# Patient Record
Sex: MALE | Employment: FULL TIME | URBAN - METROPOLITAN AREA
[De-identification: names, ages, dates, MRNs, and addresses within clinical notes are randomized per-mention and may not be internally consistent; named-entity substitution may affect disease eponyms.]

---

## 2017-03-10 ENCOUNTER — CLINICAL SUPPORT (OUTPATIENT)
Dept: CARDIOLOGY CLINIC | Age: 67
End: 2017-03-10

## 2017-03-10 DIAGNOSIS — Z95.0 CARDIAC PACEMAKER IN SITU: Primary | ICD-10-CM

## 2017-06-16 ENCOUNTER — CLINICAL SUPPORT (OUTPATIENT)
Dept: CARDIOLOGY CLINIC | Age: 67
End: 2017-06-16

## 2017-06-16 DIAGNOSIS — Z95.0 CARDIAC PACEMAKER IN SITU: Primary | ICD-10-CM

## 2017-09-29 ENCOUNTER — CLINICAL SUPPORT (OUTPATIENT)
Dept: CARDIOLOGY CLINIC | Age: 67
End: 2017-09-29

## 2017-09-29 DIAGNOSIS — Z95.0 CARDIAC PACEMAKER IN SITU: Primary | ICD-10-CM

## 2017-11-25 ENCOUNTER — HOSPITAL ENCOUNTER (OUTPATIENT)
Dept: CT IMAGING | Age: 67
Discharge: HOME | End: 2017-11-25
Attending: INTERNAL MEDICINE
Payer: COMMERCIAL

## 2017-11-25 DIAGNOSIS — R10.32 LLQ PAIN: ICD-10-CM

## 2017-11-25 PROCEDURE — 74177 CT ABD & PELVIS W/CONTRAST: CPT

## 2017-11-25 PROCEDURE — 74011636320 HC RX REV CODE- 636/320

## 2017-11-25 RX ADMIN — IOPAMIDOL 100 ML: 755 INJECTION, SOLUTION INTRAVENOUS at 09:26

## 2018-01-05 ENCOUNTER — CLINICAL SUPPORT (OUTPATIENT)
Dept: CARDIOLOGY CLINIC | Age: 68
End: 2018-01-05

## 2018-01-05 DIAGNOSIS — Z95.0 CARDIAC PACEMAKER IN SITU: Primary | ICD-10-CM

## 2018-04-13 ENCOUNTER — CLINICAL SUPPORT (OUTPATIENT)
Dept: CARDIOLOGY CLINIC | Age: 68
End: 2018-04-13

## 2018-04-13 DIAGNOSIS — Z95.0 CARDIAC PACEMAKER IN SITU: Primary | ICD-10-CM

## 2018-07-16 ENCOUNTER — CLINICAL SUPPORT (OUTPATIENT)
Dept: CARDIOLOGY CLINIC | Age: 68
End: 2018-07-16

## 2018-07-16 DIAGNOSIS — Z95.0 CARDIAC PACEMAKER IN SITU: Primary | ICD-10-CM

## 2018-10-08 ENCOUNTER — CLINICAL SUPPORT (OUTPATIENT)
Dept: CARDIOLOGY CLINIC | Age: 68
End: 2018-10-08

## 2018-10-08 DIAGNOSIS — Z95.0 CARDIAC PACEMAKER IN SITU: Primary | ICD-10-CM

## 2019-01-21 ENCOUNTER — CLINICAL SUPPORT (OUTPATIENT)
Dept: CARDIOLOGY CLINIC | Age: 69
End: 2019-01-21

## 2019-01-21 DIAGNOSIS — Z95.0 CARDIAC PACEMAKER IN SITU: Primary | ICD-10-CM

## 2019-07-01 ENCOUNTER — CLINICAL SUPPORT (OUTPATIENT)
Dept: CARDIOLOGY CLINIC | Age: 69
End: 2019-07-01

## 2019-07-01 DIAGNOSIS — Z95.0 CARDIAC PACEMAKER IN SITU: Primary | ICD-10-CM

## 2019-10-07 ENCOUNTER — CLINICAL SUPPORT (OUTPATIENT)
Dept: CARDIOLOGY CLINIC | Age: 69
End: 2019-10-07

## 2019-10-07 DIAGNOSIS — Z95.0 CARDIAC PACEMAKER IN SITU: Primary | ICD-10-CM

## 2020-01-03 ENCOUNTER — CLINICAL SUPPORT (OUTPATIENT)
Dept: CARDIOLOGY CLINIC | Age: 70
End: 2020-01-03

## 2020-01-03 DIAGNOSIS — Z95.0 CARDIAC PACEMAKER IN SITU: Primary | ICD-10-CM

## 2020-02-13 ENCOUNTER — HOSPITAL ENCOUNTER (OUTPATIENT)
Dept: CT IMAGING | Age: 70
Discharge: HOME | End: 2020-02-13
Attending: INTERNAL MEDICINE
Payer: COMMERCIAL

## 2020-02-13 DIAGNOSIS — R05.9 COUGH: ICD-10-CM

## 2020-02-13 PROCEDURE — 71250 CT THORAX DX C-: CPT

## 2020-03-02 ENCOUNTER — CLINICAL SUPPORT (OUTPATIENT)
Dept: CARDIOLOGY CLINIC | Age: 70
End: 2020-03-02

## 2020-03-02 DIAGNOSIS — Z95.0 CARDIAC PACEMAKER IN SITU: Primary | ICD-10-CM

## 2020-03-03 ENCOUNTER — TELEPHONE (OUTPATIENT)
Dept: CARDIOLOGY CLINIC | Age: 70
End: 2020-03-03

## 2020-03-03 NOTE — TELEPHONE ENCOUNTER
Returned patient's call, ID verified using two patient identifiers. Answered all of patient's questions to his satisfaction. His generator change will be an outpatient procedure and it is fine for him to have his colonoscopy as scheduled.

## 2020-03-10 NOTE — PROGRESS NOTES
HISTORY OF PRESENTING ILLNESS      Benjamin Keys is a 81 y.o. male with history of complete heart block s/p dual chamber pacemaker, hypertension, dyslipidemia, right renal mass, ?atrial fibrillation/atrial flutter whose pacemaker is now nearing GIL[place]. Denies GOTTLIEB, edema, fatigue, syncope. No recurrences of AF/AFL on device check today. ACTIVE PROBLEM LIST     Patient Active Problem List    Diagnosis Date Noted    Pacemaker 10/14/2014    Dyslipidemia 10/14/2014    Other and unspecified hyperlipidemia 12/07/2012    Essential hypertension, benign 12/07/2012    Complete heart block ([address] 12/07/2012    Right renal mass 12/04/2012           PAST MEDICAL HISTORY     Past Medical History:   Diagnosis Date    Hyperlipidemia     Other ill-defined conditions(799.89)     right renal mass    Other ill-defined conditions(799.89)     right renal mass    Pacemaker 11/27/12    [employer] #[id number] dual chamber system  [facility]           PAST SURGICAL HISTORY     Past Surgical History:   Procedure Laterality Date    HX NEPHRECTOMY      right partial    HX PACEMAKER      11/12          ALLERGIES     No Known Allergies       FAMILY HISTORY     Family History   Problem Relation Age of Onset    Strabismus Mother     Heart Attack Father     Breast Cancer Sister     negative for cardiac disease       SOCIAL HISTORY     Social History     Socioeconomic History    Marital status:      Spouse name: Not on file    Number of children: Not on file    Years of education: Not on file    Highest education level: Not on file   Tobacco Use    Smoking status: Former Smoker     Packs/day: 0.25     Years: 30.00     Pack years: 7.50    Smokeless tobacco: Former User   Substance and Sexual Activity    Alcohol use: Yes     Comment: occassionally    Drug use: No         MEDICATIONS     Current Outpatient Medications   Medication Sig    aspirin delayed-release 81 mg tablet Take  by mouth daily.     losartan (COZAAR) 25 mg tablet Take 25 mg by mouth daily.  rosuvastatin (CRESTOR) 5 mg tablet Take 5 mg by mouth daily.  PSYLLIUM SEED, WITH SUGAR, (METAMUCIL PO) Take  by mouth. No current facility-administered medications for this visit. I have reviewed the nurses notes, vitals, problem list, allergy list, medical history, family, social history and medications. REVIEW OF SYMPTOMS      General: Pt denies excessive weight gain or loss. Pt is able to conduct ADL's  HEENT: Denies blurred vision, headaches, hearing loss, epistaxis and difficulty swallowing. Respiratory: Denies cough, congestion, shortness of breath, GOTTLIEB, wheezing or stridor. Cardiovascular: Denies precordial pain, palpitations, edema or PND  Gastrointestinal: Denies poor appetite, indigestion, abdominal pain or blood in stool  Genitourinary: Denies hematuria, dysuria, increased urinary frequency  Musculoskeletal: Denies joint pain or swelling from muscles or joints  Neurologic: Denies tremor, paresthesias, headache, or sensory motor disturbance  Psychiatric: Denies confusion, insomnia, depression  Integumentray: Denies rash, itching or ulcers. Hematologic: Denies easy bruising, bleeding       PHYSICAL EXAMINATION      There were no vitals filed for this visit. General: Well developed, in no acute distress. HEENT: No jaundice, oral mucosa moist, no oral ulcers  Neck: Supple, no stiffness, no lymphadenopathy, supple  Heart:  Normal S1/S2 negative S3 or S4. Regular, no murmur, gallop or rub, no jugular venous distention  Respiratory: Clear bilaterally x 4, no wheezing or rales  Abdomen:   Soft, non-tender, bowel sounds are active.   Extremities:  No edema, normal cap refill, no cyanosis. Musculoskeletal: No clubbing, no deformities  Neuro: A&Ox3, speech clear, gait stable, cooperative, no focal neurologic deficits  Skin: Skin color is normal. No rashes or lesions.  Non diaphoretic, moist.  Vascular: 2+ pulses symmetric in all extremities DIAGNOSTIC DATA      EKG: Vpaced       LABORATORY DATA      Lab Results   Component Value Date/Time    WBC 9.8 10/28/2014 08:18 PM    Hemoglobin (POC) 15.0 11/26/2012 04:22 PM    HGB 13.2 10/28/2014 08:18 PM    Hematocrit (POC) 44 11/26/2012 04:22 PM    HCT 39.4 10/28/2014 08:18 PM    PLATELET 557 58/56/8699 08:18 PM    MCV 89.7 10/28/2014 08:18 PM      Lab Results   Component Value Date/Time    Sodium 136 10/28/2014 08:18 PM    Potassium 3.8 10/28/2014 08:18 PM    Chloride 101 10/28/2014 08:18 PM    CO2 30 10/28/2014 08:18 PM    Anion gap 5 10/28/2014 08:18 PM    Glucose 123 (H) 10/28/2014 08:18 PM    BUN 12 10/28/2014 08:18 PM    Creatinine 0.95 10/28/2014 08:18 PM    BUN/Creatinine ratio 13 10/28/2014 08:18 PM    GFR est AA >60 10/28/2014 08:18 PM    GFR est non-AA >60 10/28/2014 08:18 PM    Calcium 9.6 10/28/2014 08:18 PM    Bilirubin, total 0.5 10/28/2014 08:18 PM    AST (SGOT) 20 10/28/2014 08:18 PM    Alk. phosphatase 74 10/28/2014 08:18 PM    Protein, total 7.9 10/28/2014 08:18 PM    Albumin 4.1 10/28/2014 08:18 PM    Globulin 3.8 10/28/2014 08:18 PM    A-G Ratio 1.1 10/28/2014 08:18 PM    ALT (SGPT) 25 10/28/2014 08:18 PM           ASSESSMENT      1. Atrial fibrillation/atrial flutter  2. Hypertension  3. Complete heart block   4. Dyslipidemia  5. Pacemaker       PLAN     Plan for pacemaker generator change with [employer] once at [facility]. Continue to defer anticoagulation until sustained arrhythmias observed. FOLLOW-UP     Post procedure      Thank you, Ivett Robertson MD for allowing me to participate in the care of this extraordinarily pleasant male. Please do not hesitate to contact me for further questions/concerns.          Stan Sheets MD  Cardiac Electrophysiology / Cardiology    [address]  [address] [facility], [address] [place] [address] [address], [facility] [address] [id number]  [phone] / [phone] Fax   [phone] / [phone] Fax

## 2020-03-11 ENCOUNTER — OFFICE VISIT (OUTPATIENT)
Dept: CARDIOLOGY CLINIC | Age: 70
End: 2020-03-11

## 2020-03-11 ENCOUNTER — CLINICAL SUPPORT (OUTPATIENT)
Dept: CARDIOLOGY CLINIC | Age: 70
End: 2020-03-11

## 2020-03-11 VITALS
OXYGEN SATURATION: 97 % | HEART RATE: 60 BPM | WEIGHT: 144.4 LBS | RESPIRATION RATE: 16 BRPM | BODY MASS INDEX: 21.89 KG/M2 | HEIGHT: 68 IN | DIASTOLIC BLOOD PRESSURE: 82 MMHG | SYSTOLIC BLOOD PRESSURE: 122 MMHG

## 2020-03-11 DIAGNOSIS — Z95.0 CARDIAC PACEMAKER IN SITU: ICD-10-CM

## 2020-03-11 DIAGNOSIS — Z95.0 CARDIAC PACEMAKER IN SITU: Primary | ICD-10-CM

## 2020-03-11 DIAGNOSIS — I44.2 COMPLETE HEART BLOCK (HCC): ICD-10-CM

## 2020-03-11 DIAGNOSIS — I10 ESSENTIAL HYPERTENSION, BENIGN: ICD-10-CM

## 2020-03-11 NOTE — PROGRESS NOTES
ROOM # 5  Denies any cardiac complaints this am.  No ER visits or hospital admissions recently.   Visit Vitals  /82 (BP 1 Location: Left arm, BP Patient Position: Sitting)   Pulse 60   Resp 16   Ht 5' 8\" (1.727 m)   Wt 144 lb 6.4 oz (65.5 kg)   SpO2 97%   BMI 21.96 kg/m²

## 2020-03-12 ENCOUNTER — TELEPHONE (OUTPATIENT)
Dept: CARDIOLOGY CLINIC | Age: 70
End: 2020-03-12

## 2020-03-12 NOTE — TELEPHONE ENCOUNTER
Patient states that he would like to speak with you regarding scheduling his pacemaker change. Please advise.     Phone #: [phone]  Thanks

## 2020-03-12 NOTE — TELEPHONE ENCOUNTER
Returned patient call, ID verified using two patient identifiers. patient verbalizing concerns about his device battery needing to be replaced and whether the Coronavirus will affect him being able to get his procedure. Advised patient that his device has not triggered GILES as of yet. He is scheduled for monthly checks to monitor his battery life and that once the device becomes GILES he has 3 months to get the generator replaced. Reassured patient that hospital procedures are not being affected by the Coronavirus. Patient verbalizes understanding and would like Dr. Venecia Alcantar to be made aware of his concerns. Informed patient that I would relay information to Dr. Venecia Alcantar.

## 2020-03-23 ENCOUNTER — TELEPHONE (OUTPATIENT)
Dept: CARDIOLOGY CLINIC | Age: 70
End: 2020-03-23

## 2020-03-23 NOTE — TELEPHONE ENCOUNTER
Patient states that he believes that his battery may be getting low on his pacemaker. But he is concerned with the Covid 19 and would like to discuss this further with you. Please advise.         Phone [phone]

## 2020-03-24 NOTE — TELEPHONE ENCOUNTER
Returned phone call to patient. Assured him that we have not received GILES alert for his pacemaker. Once we do, we have 3 months to schedule generator change which can still be done despite COVID 19 as it is not considered elective. Patient verbalized understanding. Will keep 4/15 appointment for now, although anticipate moving this to a virtual visit.     Nirmala Christian, LIVE

## 2020-04-01 ENCOUNTER — TELEPHONE (OUTPATIENT)
Dept: CARDIOLOGY CLINIC | Age: 70
End: 2020-04-01

## 2020-04-01 NOTE — TELEPHONE ENCOUNTER
Patient would like to speak with you regarding his heart rate as he states it is low at 54-55 at times. Patient just has a few concerns he would like to discuss further with you.      Phone: [phone]

## 2020-04-02 NOTE — TELEPHONE ENCOUNTER
Returned call, ID verified using two patient identifiers. Patient has been taking his pulse radially and he reports that it is 55 more frequently now. Denies any fatigue or dizziness, does report feeling more anxious. Pacemaker near GILES 0.25 months as of 3/2/20. Advised patient that I would relay information to Dr. Dylan Lau for his recommendations. Once I receive Dr. Dylan Lau recommendations I will call patient . Patient verbalizes understanding and is agreeable to this plan.

## 2020-04-13 NOTE — TELEPHONE ENCOUNTER
Denice Hancock MD  You; Jose Alberto Miller NP 2 hours ago (8:58 AM)      Cannot (does not) need to have PPM gen change until 6 weeks of battery remaining. Unable to proceed prior to gretchen. HR can fluctuate on PPM at times but if HR is at 55 can have it checked again to confirm whether he's triggered gretchen (this can sometimes be a sign that certain devices have hit gretchen)           Sherry Gonzalez MD; Jose Alberto Miller NP 11 days ago      Patient has a SJM dual chamber Lequire pacemaker implanted 11/27/12, near G[facility]CHEN. Please advise. Called patient, ID verified using two patient identifiers. Made patient aware of above information. Patient has an appointment for device check on Wednesday 4/15/20 @ 8:45 am. Patient verbalizes understanding of all information.

## 2020-04-15 ENCOUNTER — CLINICAL SUPPORT (OUTPATIENT)
Dept: CARDIOLOGY CLINIC | Age: 70
End: 2020-04-15

## 2020-04-15 DIAGNOSIS — Z95.0 CARDIAC PACEMAKER IN SITU: Primary | ICD-10-CM

## 2020-04-20 ENCOUNTER — TELEPHONE (OUTPATIENT)
Dept: CARDIOLOGY CLINIC | Age: 70
End: 2020-04-20

## 2020-04-20 DIAGNOSIS — Z45.018 ELECTIVE REPLACEMENT INDICATED FOR PACEMAKER: Primary | ICD-10-CM

## 2020-04-20 RX ORDER — SODIUM CHLORIDE 0.9 % (FLUSH) 0.9 %
5-40 SYRINGE (ML) INJECTION AS NEEDED
Status: CANCELLED | OUTPATIENT
Start: 2020-04-20

## 2020-04-20 RX ORDER — SODIUM CHLORIDE 0.9 % (FLUSH) 0.9 %
5-40 SYRINGE (ML) INJECTION EVERY 8 HOURS
Status: CANCELLED | OUTPATIENT
Start: 2020-04-20

## 2020-04-20 NOTE — TELEPHONE ENCOUNTER
Returned call, ID verified using two patient identifiers. Scheduled patient for [facility] dual chamber pacemaker generator change for Tuesday 5/5/20 @ [facility] with an arrival time of 6 AM. Pre procedure instructions reviewed verbally and will be mailed to patient's home address as well.

## 2020-04-20 NOTE — TELEPHONE ENCOUNTER
Patient states he is still waiting to set up a date for his pacemaker battery change procedure. Please advise.      Phone: [phone]

## 2020-04-20 NOTE — LETTER
4/20/2020 1:56 PM 
 
Mr. Maite Foster [address] [id number] You are scheduled for a pacemaker generator change at [facility] on Tuesday 5/5/20. Please arrive at the patient registration desk located on the 2nd floor of the main building at 6:00 am. 
 
Do not eat or drink anything after midnight the night before your procedure. You will need a . You may need to stay overnight, please come prepared to stay. You may take your normal medications with a sip of water the morning of your procedure. Lab instructions: Please have labs drawn 5-7 days prior to scheduled procedure at any [facility] or [email or web address] location. Fasting is not required. [email or web address] is located at [place] [address], [address] [facility] (on the 2nd floor of the [facility])  Phone # [phone] Please call our office if you have any questions at [phone]. Please call the office if you develop any type of illness prior to your procedure. Sincerely, Edgardo Arora MD/Jeimy Rodriguez LPN

## 2020-04-23 ENCOUNTER — TELEPHONE (OUTPATIENT)
Dept: CARDIOLOGY CLINIC | Age: 70
End: 2020-04-23

## 2020-04-23 NOTE — TELEPHONE ENCOUNTER
Returned phone call to pt. Pt states due to COVID-19 he does not want his wife to come inside & wait for him. He will provide his wife's cell number to communicate with her. Pt stated his prep-rocedure letter indicates 2 locations to have labwork drawn. He states WALDEMAR Rodriguez mentioned that lab work could be performed @6am in cath lab. Pt has concerns of having lab work drawn at outside facility  [address] will plan to have drawn the [facility] of [facility].

## 2020-04-23 NOTE — TELEPHONE ENCOUNTER
Patient would like to speak with Gabriela Chinchilla. He states that he has a couple of questions regarding the mail he received about his procedure.      Phone: [phone]

## 2020-05-01 ENCOUNTER — TELEPHONE (OUTPATIENT)
Dept: CARDIOLOGY CLINIC | Age: 70
End: 2020-05-01

## 2020-05-01 NOTE — TELEPHONE ENCOUNTER
Called patient to screen for COVID19 symptoms and/or possible exposure prior to their pacemaker generator change on 5/5/20 at 8 AM.   Patient with no identifiable symptoms/risks. Given instructions for procedure to include self-isolating for 72 hours prior to the procedure. Patient verbalizes understanding.

## 2020-05-05 ENCOUNTER — HOSPITAL ENCOUNTER (OUTPATIENT)
Age: 70
Setting detail: OUTPATIENT SURGERY
Discharge: HOME | End: 2020-05-05
Attending: INTERNAL MEDICINE | Admitting: INTERNAL MEDICINE
Payer: MEDICARE

## 2020-05-05 VITALS
RESPIRATION RATE: 16 BRPM | HEIGHT: 68 IN | OXYGEN SATURATION: 99 % | WEIGHT: 146.39 LBS | HEART RATE: 60 BPM | SYSTOLIC BLOOD PRESSURE: 112 MMHG | TEMPERATURE: 98.3 F | BODY MASS INDEX: 22.19 KG/M2 | DIASTOLIC BLOOD PRESSURE: 71 MMHG

## 2020-05-05 DIAGNOSIS — Z45.018 FITTING AND ADJUSTMENT OF CARDIAC PACEMAKER: ICD-10-CM

## 2020-05-05 LAB
ANION GAP SERPL CALC-SCNC: 6 MMOL/L (ref 5–15)
BUN SERPL-MCNC: 16 MG/DL (ref 6–20)
BUN/CREAT SERPL: 15 (ref 12–20)
CALCIUM SERPL-MCNC: 9.1 MG/DL (ref 8.5–10.1)
CHLORIDE SERPL-SCNC: 107 MMOL/L (ref 97–108)
CO2 SERPL-SCNC: 27 MMOL/L (ref 21–32)
CREAT SERPL-MCNC: 1.06 MG/DL (ref 0.7–1.3)
ERYTHROCYTE [DISTWIDTH] IN BLOOD BY AUTOMATED COUNT: 12.8 % (ref 11.5–14.5)
GLUCOSE SERPL-MCNC: 89 MG/DL (ref 65–100)
HCT VFR BLD AUTO: 43.4 % (ref 36.6–50.3)
HGB BLD-MCNC: 14.1 G/DL (ref 12.1–17)
MCH RBC QN AUTO: 30.1 PG (ref 26–34)
MCHC RBC AUTO-ENTMCNC: 32.5 G/DL (ref 30–36.5)
MCV RBC AUTO: 92.5 FL (ref 80–99)
NRBC # BLD: 0 K/UL (ref 0–0.01)
NRBC BLD-RTO: 0 PER 100 WBC
PLATELET # BLD AUTO: 212 K/UL (ref 150–400)
PMV BLD AUTO: 9.5 FL (ref 8.9–12.9)
POTASSIUM SERPL-SCNC: 3.8 MMOL/L (ref 3.5–5.1)
RBC # BLD AUTO: 4.69 M/UL (ref 4.1–5.7)
SODIUM SERPL-SCNC: 140 MMOL/L (ref 136–145)
WBC # BLD AUTO: 5.2 K/UL (ref 4.1–11.1)

## 2020-05-05 PROCEDURE — 80048 BASIC METABOLIC PNL TOTAL CA: CPT

## 2020-05-05 PROCEDURE — 77030035236 HC SUT PDS STRATFX BARB J&J -B: Performed by: INTERNAL MEDICINE

## 2020-05-05 PROCEDURE — 77030018673: Performed by: INTERNAL MEDICINE

## 2020-05-05 PROCEDURE — 99153 MOD SED SAME PHYS/QHP EA: CPT | Performed by: INTERNAL MEDICINE

## 2020-05-05 PROCEDURE — 77030018729 HC ELECTRD DEFIB PAD CARD -B: Performed by: INTERNAL MEDICINE

## 2020-05-05 PROCEDURE — C1785 PMKR, DUAL, RATE-RESP: HCPCS | Performed by: INTERNAL MEDICINE

## 2020-05-05 PROCEDURE — 36415 COLL VENOUS BLD VENIPUNCTURE: CPT

## 2020-05-05 PROCEDURE — 33228 REMV&REPLC PM GEN DUAL LEAD: CPT | Performed by: INTERNAL MEDICINE

## 2020-05-05 PROCEDURE — 74011000250 HC RX REV CODE- 250: Performed by: INTERNAL MEDICINE

## 2020-05-05 PROCEDURE — 85027 COMPLETE CBC AUTOMATED: CPT

## 2020-05-05 PROCEDURE — 74011250636 HC RX REV CODE- 250/636: Performed by: INTERNAL MEDICINE

## 2020-05-05 PROCEDURE — 74011000272 HC RX REV CODE- 272: Performed by: INTERNAL MEDICINE

## 2020-05-05 PROCEDURE — 77030002934 HC SUT MCRYL J&J -B: Performed by: INTERNAL MEDICINE

## 2020-05-05 PROCEDURE — 77030028698 HC BLD TISS PLSM MEDT -D: Performed by: INTERNAL MEDICINE

## 2020-05-05 PROCEDURE — 99152 MOD SED SAME PHYS/QHP 5/>YRS: CPT | Performed by: INTERNAL MEDICINE

## 2020-05-05 DEVICE — PCMKR DUAL CHMBR DR RF -- ASSURITY MRI: Type: IMPLANTABLE DEVICE | Status: FUNCTIONAL

## 2020-05-05 RX ORDER — MIDAZOLAM HYDROCHLORIDE 1 MG/ML
INJECTION, SOLUTION INTRAMUSCULAR; INTRAVENOUS AS NEEDED
Status: DISCONTINUED | OUTPATIENT
Start: 2020-05-05 | End: 2020-05-05 | Stop reason: HOSPADM

## 2020-05-05 RX ORDER — FENTANYL CITRATE 50 UG/ML
INJECTION, SOLUTION INTRAMUSCULAR; INTRAVENOUS AS NEEDED
Status: DISCONTINUED | OUTPATIENT
Start: 2020-05-05 | End: 2020-05-05 | Stop reason: HOSPADM

## 2020-05-05 RX ORDER — CEPHALEXIN 500 MG/1
500 CAPSULE ORAL 3 TIMES DAILY
Qty: 15 CAP | Refills: 0 | Status: SHIPPED | OUTPATIENT
Start: 2020-05-05 | End: 2020-05-10

## 2020-05-05 RX ORDER — HEPARIN SODIUM 200 [USP'U]/100ML
INJECTION, SOLUTION INTRAVENOUS
Status: COMPLETED | OUTPATIENT
Start: 2020-05-05 | End: 2020-05-05

## 2020-05-05 RX ORDER — BUPIVACAINE HYDROCHLORIDE 5 MG/ML
INJECTION, SOLUTION EPIDURAL; INTRACAUDAL AS NEEDED
Status: DISCONTINUED | OUTPATIENT
Start: 2020-05-05 | End: 2020-05-05 | Stop reason: HOSPADM

## 2020-05-05 RX ORDER — GENTAMICIN SULFATE 80 MG/100ML
80 INJECTION, SOLUTION INTRAVENOUS ONCE
Status: COMPLETED | OUTPATIENT
Start: 2020-05-05 | End: 2020-05-05

## 2020-05-05 RX ORDER — CEFAZOLIN SODIUM 1 G/3ML
INJECTION, POWDER, FOR SOLUTION INTRAMUSCULAR; INTRAVENOUS AS NEEDED
Status: DISCONTINUED | OUTPATIENT
Start: 2020-05-05 | End: 2020-05-05 | Stop reason: HOSPADM

## 2020-05-05 RX ORDER — LIDOCAINE HYDROCHLORIDE AND EPINEPHRINE 10; 10 MG/ML; UG/ML
INJECTION, SOLUTION INFILTRATION; PERINEURAL AS NEEDED
Status: DISCONTINUED | OUTPATIENT
Start: 2020-05-05 | End: 2020-05-05 | Stop reason: HOSPADM

## 2020-05-05 RX ADMIN — GENTAMICIN SULFATE 80 MG: 80 INJECTION, SOLUTION INTRAVENOUS at 08:25

## 2020-05-05 NOTE — PROGRESS NOTES
5581    Patient arrived. ID and allergies verified verbally with patient. Pt voices understanding of procedure to be performed. Consent obtained. Pt prepped for procedure. 8:03 AM    TRANSFER - OUT REPORT:    Verbal report given to Keegan Talbot RN (name) on [place]on  being transferred to EP LAB (unit) for ordered procedure       Report consisted of patients Situation, Background, Assessment and   Recommendations(SBAR). Information from the following report(s) SBAR was reviewed with the receiving nurse. Lines:       Opportunity for questions and clarification was provided. Patient transported with:   Registered Nurse        9023    TRANSFER - IN REPORT:    Verbal report received from Keegan Talbot [facility] (name) [facility]  [facility] from [facility] (unit) for routine progression of care      Report consisted of patients Situation, Background, Assessment and   Recommendations(SBAR). Information from the following report(s) Procedure Summary was reviewed with the receiving nurse. Opportunity for questions and clarification was provided. Assessment completed upon patients arrival to unit and care assumed. 10:13 AM    Per COVID 19 discharge instructions were reviewed via phone with patient's wife Keegan Lr. Voiced understanding. Discharge instructions also reviewed with patient. Patient voiced understanding. Patient given copy of discharge instructions to take home and his new device ID card and docking station. 1030    Pt discharged via wheelchair with Mingo Pitts RN . Personal belongings with patient upon discharge.

## 2020-05-05 NOTE — H&P
HISTORY OF PRESENTING ILLNESS      Campos Baca is a 78 y.o. male with history of complete heart block s/p dual chamber pacemaker, hypertension, dyslipidemia, right renal mass, ?atrial fibrillation/atrial flutter whose pacemaker is now at [facility].        ACTIVE PROBLEM LIST           Patient Active Problem List     Diagnosis Date Noted    Pacemaker 10/14/2014    Dyslipidemia 10/14/2014    Other and unspecified hyperlipidemia 12/07/2012    Essential hypertension, benign 12/07/2012    Complete heart block ([address] 12/07/2012    Right renal mass 12/04/2012             PAST MEDICAL HISTORY           Past Medical History:   Diagnosis Date    Hyperlipidemia      Other ill-defined conditions(799.89)       right renal mass    Other ill-defined conditions(799.89)       right renal mass    Pacemaker 11/27/12     [employer] #[id number] dual chamber system  [facility]             PAST SURGICAL HISTORY            Past Surgical History:   Procedure Laterality Date    HX NEPHRECTOMY         right partial    HX PACEMAKER         11/12             ALLERGIES      No Known Allergies         FAMILY HISTORY            Family History   Problem Relation Age of Onset    Strabismus Mother      Heart Attack Father      Breast Cancer Sister      negative for cardiac disease         SOCIAL HISTORY      Social History               Socioeconomic History    Marital status:        Spouse name: Not on file    Number of children: Not on file    Years of education: Not on file    Highest education level: Not on file   Tobacco Use    Smoking status: Former Smoker       Packs/day: 0.25       Years: 30.00       Pack years: 7.50    Smokeless tobacco: Former User   Substance and Sexual Activity    Alcohol use: Yes       Comment: occassionally    Drug use: No               MEDICATIONS      Current Outpatient Medications   Medication Sig    aspirin delayed-release 81 mg tablet Take  by mouth daily.     losartan (COZAAR) 25 mg tablet Take 25 mg by mouth daily.  rosuvastatin (CRESTOR) 5 mg tablet Take 5 mg by mouth daily.  PSYLLIUM SEED, WITH SUGAR, (METAMUCIL PO) Take  by mouth.        No current facility-administered medications for this visit.          I have reviewed the nurses notes, vitals, problem list, allergy list, medical history, family, social history and medications.         REVIEW OF SYMPTOMS      General: Pt denies excessive weight gain or loss. Pt is able to conduct ADL's  HEENT: Denies blurred vision, headaches, hearing loss, epistaxis and difficulty swallowing. Respiratory: Denies cough, congestion, shortness of breath, GOTTLIEB, wheezing or stridor. Cardiovascular: Denies precordial pain, palpitations, edema or PND  Gastrointestinal: Denies poor appetite, indigestion, abdominal pain or blood in stool  Genitourinary: Denies hematuria, dysuria, increased urinary frequency  Musculoskeletal: Denies joint pain or swelling from muscles or joints  Neurologic: Denies tremor, paresthesias, headache, or sensory motor disturbance  Psychiatric: Denies confusion, insomnia, depression  Integumentray: Denies rash, itching or ulcers. Hematologic: Denies easy bruising, bleeding         PHYSICAL EXAMINATION      There were no vitals filed for this visit. General: Well developed, in no acute distress. HEENT: No jaundice, oral mucosa moist, no oral ulcers  Neck: Supple, no stiffness, no lymphadenopathy, supple  Heart:  Normal S1/S2 negative S3 or S4. Regular, no murmur, gallop or rub, no jugular venous distention  Respiratory: Clear bilaterally x 4, no wheezing or rales  Abdomen:   Soft, non-tender, bowel sounds are active.   Extremities:  No edema, normal cap refill, no cyanosis. Musculoskeletal: No clubbing, no deformities  Neuro: A&Ox3, speech clear, gait stable, cooperative, no focal neurologic deficits  Skin: Skin color is normal. No rashes or lesions.  Non diaphoretic, moist.  Vascular: 2+ pulses symmetric in all extremities       DIAGNOSTIC DATA      EKG: Vpaced         LABORATORY DATA            Lab Results   Component Value Date/Time     WBC 9.8 10/28/2014 08:18 PM     Hemoglobin (POC) 15.0 11/26/2012 04:22 PM     HGB 13.2 10/28/2014 08:18 PM     Hematocrit (POC) 44 11/26/2012 04:22 PM     HCT 39.4 10/28/2014 08:18 PM     PLATELET 243 56/64/4022 08:18 PM     MCV 89.7 10/28/2014 08:18 PM            Lab Results   Component Value Date/Time     Sodium 136 10/28/2014 08:18 PM     Potassium 3.8 10/28/2014 08:18 PM     Chloride 101 10/28/2014 08:18 PM     CO2 30 10/28/2014 08:18 PM     Anion gap 5 10/28/2014 08:18 PM     Glucose 123 (H) 10/28/2014 08:18 PM     BUN 12 10/28/2014 08:18 PM     Creatinine 0.95 10/28/2014 08:18 PM     BUN/Creatinine ratio 13 10/28/2014 08:18 PM     GFR est AA >60 10/28/2014 08:18 PM     GFR est non-AA >60 10/28/2014 08:18 PM     Calcium 9.6 10/28/2014 08:18 PM     Bilirubin, total 0.5 10/28/2014 08:18 PM     AST (SGOT) 20 10/28/2014 08:18 PM     Alk. phosphatase 74 10/28/2014 08:18 PM     Protein, total 7.9 10/28/2014 08:18 PM     Albumin 4.1 10/28/2014 08:18 PM     Globulin 3.8 10/28/2014 08:18 PM     A-G Ratio 1.1 10/28/2014 08:18 PM     ALT (SGPT) 25 10/28/2014 08:18 PM             ASSESSMENT      1. Atrial fibrillation/atrial flutter  2. Hypertension  3. Complete heart block   4. Dyslipidemia  5.  Pacemaker         PLAN      Plan for pacemaker generator change with MARY LOU Freitas MD  Cardiac Electrophysiology / Cardiology     [address] [place] [address]  [address] [facility], [address] [address] [address], [address]  [place] Mar[facility] El[facility]agarlandsheritaB[facility]                                         [address] [facility]  [phone] / [phone] Fax                                    [phone] / [phone] Fax

## 2020-05-05 NOTE — DISCHARGE INSTRUCTIONS
Pacemaker  Discharge Instructions    Please make sure you have received your Temporary Pacemaker identification card with your discharge instructions      MEDICATIONS         Take only the medications prescribed to you at discharge. ACTIVITY         Return to your normal activity, except as noted below. o Do not lift anything heavier than 10 pounds for 4 weeks with the affected arm. This is how long it takes the muscles to heal, and the leads inside your heart to stabilize their position. o Do not reach above your head with the affected arm for 4 weeks, doing so increases the risk of lead dislodgement.    o It is, however, important to move the affected arm to prevent shoulder stiffness and locking. o Avoid tight clothes or unnecessary pressure over your incision (such as bra straps or seat belts). If it is tender or sensitive to clothing, cover the incision with a soft dressing or pad.  o Questions about driving are individualized and should be discussed with one of the EP Physicians prior to discharge. SHOWERING         Leave the bandage over your incision for 14 days after the Pacemaker implant. You bandage will be removed in clinic in 14 days.  It is important to keep the bandaged area clean and dry. You may shower around the site until the bandage is removed in clinic. Thereafter, you may shower after the bandage is removed, washing it gently with soap and water. Do not apply any lotions, powders, or perfumes to the incision line.  Avoid submerging your incision in water (tub baths, hot tubs, or swimming) for four weeks.  Underneath the dressing. o If you have white steri-strips over your incision (underneath the gauze dressing), they will curl up at the end and fall off, usually within 10 days. Do not pull them off.  - OR -   o You may have a different type of closure for the incision.   If Dermabond Adhesive was used to close your incision, you will receive a separate instruction sheet. DISCHARGE PRECAUTIONS         Record your temperature every day, at the same time, for 3 weeks after your implant. A temperature of 100.5 F, or higher, can be the first sign of infection. This should be reported to your Doctor immediately.  You can have an MRI. You must be aware that any strong magnet or magnetic field can affect your Pacemaker. In general, be careful of metal detectors, heavy machinery, and any area where arc-welding is performed. Avoid metal detectors such as the ones in security checkpoints at [facility] or [address] [place] [address]. When approaching a security checkpoint show your Pacemaker ID Card to security personnel and ask to be hand searched.  Always tell your doctor or dentist that you have a Pacemaker. Antibiotics may be prescribed before certain procedures.  If you use a cell phone, hold it on the opposite side from where your Pacemaker is implanted.  Your temporary identification will be given to you with these instructions. Keep your Pacemaker card in your wallet or on your person at all times. You should receive your permanent card in 8 weeks. If you do not receive your permanent card, please call the office at [phone]. TAKING YOUR PULSE         Take your pulse the same time every day, preferably in the morning.  Sit down and rest for 5 minutes prior to taking your pulse.  Take your pulse for 1 full minute, use a clock or stop watch with a second hand.  To feel your pulse, use the first two fingers of one hand; place them on the thumb side of the wrist of the opposite hand. The pulse will be steady, regular and throbbing.  Call the EP Lab Doctors if your pulse is less than 40 beats per minute. SYMPTOMS THAT NEED TO BE REPORTED IMMEDIATELY         Temperature more than 100.4 F     Redness or warmth at the incision site, or pain for longer than the first 5 days after the implant.      Drainage from the incision site.     Swelling around the incision site.  Shortness of breath.  Rapid heart rate or palpitations.  Dizziness, lightheadedness, fainting.  Slow pulse below 40 beats per minute.  REMEMBER: If you feel something is an emergency or cannot be handled over the phone, call 911 or go to the closest emergency room.           Noel Lopez MD  Cardiac Electrophysiology / Cardiology    [address]  [address] [facility], [address] [facility] [address]  [facility], [address]         [facility]  [phone] / [phone] Fax       [phone] / [phone] Fax

## 2020-05-05 NOTE — Clinical Note
Right chest clipped, prepped with ChloraPrep and draped. Wet prep solution applied at: 806. Wet prep solution dried at: 809. Wet prep elapsed drying time: 3 mins.

## 2020-05-05 NOTE — Clinical Note
TRANSFER - IN REPORT:     Verbal report received from: Bedside. Report consisted of patient's Situation, Background, Assessment and   Recommendations(SBAR). Opportunity for questions and clarification was provided. Assessment completed upon patient's arrival to unit and care assumed. Patient transported with a Registered Nurse.

## 2020-05-05 NOTE — PROCEDURES
Cardiac Electrophysiology Report      PATIENT INFORMATION      Patient Name: Ana Del Cid  MRN: [id number]               Study Date: 2020    YOB: 1950   Age: 78 y.o. Gender: male      Procedure:  Pacemaker Generator Change    Referring Physician:  Ronald Leslie MD and Dr. Veronica Sanon     Duty Name   Electrophysiologist Geremias Horn MD   Monitor Monica Victor RN   [facility], [address] [place] [address]; Lisa Parson RN       PATIENT HISTORY     Coni Iniguez a 78 y. o. male with history of complete heart block s/p SJM right-sided dual chamber pacemaker, hypertension, dyslipidemia, right renal mass, ?atrial fibrillation/atrial flutter whose pacemaker is now at [facility]. He presents for pacemaker generator change. PROCEDURE     The patient was brought to the Cardiac Electrophysiology laboratory in a post-absorptive, fasting state. Informed consent was obtained. A peripheral IV was in place. Continuous electrocardiographic, blood pressure, O2 saturation and  CO2 monitoring was initiated. Pre-operative antibiotics were administered pre-operatively. Self-adhesive cardioversion patches were positioned on the chest.  Conscious sedation was effectuated according to protocol. The patient was then prepped and draped in the usual sterile fashion. A 50/50 mixture of lidocaine (1%) with epinephrine and bupivicaine (0.5%) was utilized for local anesthesia. An incision was performed over the chronic pulse generator. Sharp and blunt dissection was carried down to the level of the generator. Hemostasis was maintained with electrocautery. The generator and leads were carefully freed from the adhesive scar capsule. The leads appeared to be intact upon visual inspection. The pacemaker generator was disconnected from the leads. A new generator was then connected to the chronic leads. The pocket was irrigated copiously with antibiotic solution.  The generator was then placed into the pocket. The pocket was then closed in three layers using 2-O PDS, 3-O monocryl, 4-O monocryl absorbable suture material and dermabond. The skin was closed using a sub-cuticular technique. A bio-occlusive dressing were applied to the skin. The patient remained hemodynamically stable, tolerated the procedure well and was transferred in stable condition. There were no immediate complications encountered during the procedure. LEAD & GENERATOR DATA       Model # Serial #   Explanted Generator [facility] [id number]   Implanted Generator [facility] [id number]   Chronic Atrial Lead [facility] 2088 [id number]   Chronic Ventricular Lead [facility] 2088 [id number]       PACE/SENSE DATA      Sensed Wave (mV) Threshold (V) Impedance (Ohms)   Atrium 2.1 0.5 460   Ventricle Evoked 0.75 550       FINAL PROGRAMMING     Mode Lower Rate (ppm) Upper Rate (ppm)   DDD 60 120       MEDICATION SUMMARY     Medication Route Unit Total   Gentamycin IV mg 80   Ancef IV grams 2   Fentanyl IV micrograms 100   Versed IV grams 5         CONCLUSIONS     1. Successful pacemaker generator change. 2. Keflex 500 mg po tid x 5 days. 3. Wound check in EP clinic in 14 days. 4. Follow up in EP clinic in 3 month or earlier if necessary. 5. Follow up with  Teresa Goldstein MD and Dr. Barbi Duran as scheduled. Thank you, Teresa Goldstein MD and Dr. Barbi Duran for involving me in the care of this extraordinarily pleasant male.         Vivi Figueroa MD  Cardiac Electrophysiology / Cardiology    [address], [address] [facility] [address]  [address]  [phone] / [phone] Fax [phone] / [phone] Fax

## 2020-05-05 NOTE — Clinical Note
TRANSFER - OUT REPORT:     Verbal report given to: Carolyn. Report consisted of patient's Situation, Background, Assessment and   Recommendations(SBAR). Opportunity for questions and clarification was provided. Patient transported with a Registered Nurse.

## 2020-05-14 ENCOUNTER — TELEPHONE (OUTPATIENT)
Dept: CARDIOLOGY CLINIC | Age: 70
End: 2020-05-14

## 2020-05-14 NOTE — TELEPHONE ENCOUNTER
Received call from patient, ID verified using two patient identifiers. Patient states he thinks his incision is bleeding. s/p pacemaker generator change on 5/5/20. Patient states he noticed a dark area on the center of the dressing. He c/o slight tenderness at site with movement, denies any swelling, redness, chills or fever. Dressing is intact. Advised patient that what he is seeing is probably old blood or moisture that the absorbent pad has absorbed. Patient will attempt to setup his My Chart account so that he can send a picture of his dressing. Patient is aware to monitor site for any signs of active bleeding and signs and symptoms of infection.

## 2020-05-18 ENCOUNTER — VIRTUAL VISIT (OUTPATIENT)
Dept: CARDIOLOGY CLINIC | Age: 70
End: 2020-05-18

## 2020-05-18 ENCOUNTER — CLINICAL SUPPORT (OUTPATIENT)
Dept: CARDIOLOGY CLINIC | Age: 70
End: 2020-05-18

## 2020-05-18 ENCOUNTER — TELEPHONE (OUTPATIENT)
Dept: CARDIOLOGY CLINIC | Age: 70
End: 2020-05-18

## 2020-05-18 DIAGNOSIS — Z95.0 PACEMAKER: Primary | ICD-10-CM

## 2020-05-18 DIAGNOSIS — Z51.89 VISIT FOR WOUND CHECK: ICD-10-CM

## 2020-05-18 NOTE — PROGRESS NOTES
Patient presents early for wound check post-device implantation (generator change) d/t concerns of blood saturation on dressing. The dressing was removed and the site was inspected and cleaned with normal saline d/t presence of dried, old blood around incision site. The site appeared to be well-healing without ecchymosis/tenderness/erythema. Denies pain, fevers, discharge. Incision recovered at patient's request until he gets home to shower. Advised he should not leave this on any longer than a few days. Would prefer it be taken off. He verbalized understanding. All questions were answered. Device interrogation shows normal functioning. Plan:    Continue follow up in device clinic as planned.        Carry LIVE Cueto

## 2020-05-29 ENCOUNTER — HOSPITAL ENCOUNTER (OUTPATIENT)
Dept: CT IMAGING | Age: 70
Discharge: HOME | End: 2020-05-29
Attending: INTERNAL MEDICINE
Payer: MEDICARE

## 2020-05-29 DIAGNOSIS — J18.9 PNEUMONIA: ICD-10-CM

## 2020-05-29 PROCEDURE — 71250 CT THORAX DX C-: CPT

## 2020-08-18 NOTE — PROGRESS NOTES
HISTORY OF PRESENTING ILLNESS      Luis Shah is a 78 y.o. male  with history of complete heart block s/p dual chamber pacemaker, hypertension, dyslipidemia, right renal mass, ?atrial fibrillation/atrial flutter who underwent generator change and now presents for follow-up. Incision has healed well. His bruising has resolved. Interrogation reveals normal functioning. PAST MEDICAL HISTORY     Past Medical History:   Diagnosis Date    Hyperlipidemia     Other ill-defined conditions(799.89)     right renal mass    Other ill-defined conditions(799.89)     right renal mass    Pacemaker 12    [employer] #[id number] dual chamber system  CHB           PAST SURGICAL HISTORY     Past Surgical History:   Procedure Laterality Date    HX NEPHRECTOMY      right partial    HX PACEMAKER          IN REMVL PERM PM PLS GEN W/REPL PLSE GEN 2 LEAD SYS N/A 2020    Dual chamber Gen change SJM performed by Wilmer Fan MD at [address] [facility] CATH [facility]          ALLERGIES     No Known Allergies       FAMILY HISTORY     Family History   Problem Relation Age of Onset    Strabismus Mother     Heart Attack Father     Breast Cancer Sister     negative for cardiac disease       SOCIAL HISTORY     Social History     Socioeconomic History    Marital status:      Spouse name: Not on file    Number of children: Not on file    Years of education: Not on file    Highest education level: Not on file   Tobacco Use    Smoking status: Former Smoker     Packs/day: 0.25     Years: 30.00     Pack years: 7.50     Last attempt to quit: 3/11/1980     Years since quittin.4    Smokeless tobacco: Former User   Substance and Sexual Activity    Alcohol use: Yes     Comment: occassionally    Drug use: No         MEDICATIONS     Current Outpatient Medications   Medication Sig    aspirin delayed-release 81 mg tablet Take  by mouth daily.  losartan (COZAAR) 25 mg tablet Take 25 mg by mouth daily.     rosuvastatin (CRESTOR) 5 mg tablet Take 5 mg by mouth daily.  PSYLLIUM SEED, WITH SUGAR, (METAMUCIL PO) Take  by mouth. No current facility-administered medications for this visit. I have reviewed the nurses notes, vitals, problem list, allergy list, medical history, family, social history and medications. REVIEW OF SYMPTOMS      General: Pt denies excessive weight gain or loss. Pt is able to conduct ADL's  HEENT: Denies blurred vision, headaches, hearing loss, epistaxis and difficulty swallowing. Respiratory: Denies cough, congestion, shortness of breath, GOTTLIEB, wheezing or stridor. Cardiovascular: Denies precordial pain, palpitations, edema or PND  Gastrointestinal: Denies poor appetite, indigestion, abdominal pain or blood in stool  Genitourinary: Denies hematuria, dysuria, increased urinary frequency  Musculoskeletal: Denies joint pain or swelling from muscles or joints  Neurologic: Denies tremor, paresthesias, headache, or sensory motor disturbance  Psychiatric: Denies confusion, insomnia, depression  Integumentray: Denies rash, itching or ulcers. Hematologic: Denies easy bruising, bleeding       PHYSICAL EXAMINATION      Vitals: see vitals section  General: Well developed, in no acute distress. HEENT: No jaundice, oral mucosa moist, no oral ulcers  Neck: Supple, no stiffness, no lymphadenopathy, supple  Heart:  Normal S1/S2 negative S3 or S4. Regular, no murmur, gallop or rub, no jugular venous distention  Respiratory: Clear bilaterally x 4, no wheezing or rales  Abdomen:   Soft, non-tender, bowel sounds are active. Extremities:  No edema, normal cap refill, no cyanosis. Musculoskeletal: No clubbing, no deformities  Neuro: A&Ox3, speech clear, gait stable, cooperative, no focal neurologic deficits  Skin: Skin color is normal. No rashes or lesions.  Non diaphoretic, moist.  Vascular: 2+ pulses symmetric in all extremities       DIAGNOSTIC DATA      EKG:        LABORATORY DATA      Lab Results Component Value Date/Time    WBC 5.2 05/05/2020 07:15 AM    Hemoglobin (POC) 15.0 11/26/2012 04:22 PM    HGB 14.1 05/05/2020 07:15 AM    Hematocrit (POC) 44 11/26/2012 04:22 PM    HCT 43.4 05/05/2020 07:15 AM    PLATELET 891 73/90/4167 07:15 AM    MCV 92.5 05/05/2020 07:15 AM      Lab Results   Component Value Date/Time    Sodium 140 05/05/2020 07:15 AM    Potassium 3.8 05/05/2020 07:15 AM    Chloride 107 05/05/2020 07:15 AM    CO2 27 05/05/2020 07:15 AM    Anion gap 6 05/05/2020 07:15 AM    Glucose 89 05/05/2020 07:15 AM    BUN 16 05/05/2020 07:15 AM    Creatinine 1.06 05/05/2020 07:15 AM    BUN/Creatinine ratio 15 05/05/2020 07:15 AM    GFR est AA >60 05/05/2020 07:15 AM    GFR est non-AA >60 05/05/2020 07:15 AM    Calcium 9.1 05/05/2020 07:15 AM    Bilirubin, total 0.5 10/28/2014 08:18 PM    Alk. phosphatase 74 10/28/2014 08:18 PM    Protein, total 7.9 10/28/2014 08:18 PM    Albumin 4.1 10/28/2014 08:18 PM    Globulin 3.8 10/28/2014 08:18 PM    A-G Ratio 1.1 10/28/2014 08:18 PM    ALT (SGPT) 25 10/28/2014 08:18 PM           ASSESSMENT      1. Atrial fibrillation/atrial flutter  2. Hypertension  3. Complete heart block   4. Dyslipidemia  5. Pacemaker   A. SJM   B. Right sided        PLAN     Continue monitoring in device clinic. ICD-10-CM ICD-9-CM    1. Pacemaker  Z95.0 V45.01    2. Complete heart block (HCC)  I44.2 426.0    3. Essential hypertension, benign  I10 401.1      No orders of the defined types were placed in this encounter. FOLLOW-UP     1 year      Thank you, Freddrick Cheadle, MD for allowing me to participate in the care of this extraordinarily pleasant male. Please do not hesitate to contact me for further questions/concerns.          Gina Gruber MD  Cardiac Electrophysiology / Cardiology    [address]  [address] [facility], [address] [place] [address] [address] [facility] [address] [place]  [phone] / [phone] Fax   [phone] / [phone] Fax

## 2020-08-19 ENCOUNTER — OFFICE VISIT (OUTPATIENT)
Dept: CARDIOLOGY CLINIC | Age: 70
End: 2020-08-19
Payer: MEDICARE

## 2020-08-19 ENCOUNTER — CLINICAL SUPPORT (OUTPATIENT)
Dept: CARDIOLOGY CLINIC | Age: 70
End: 2020-08-19
Payer: MEDICARE

## 2020-08-19 VITALS
WEIGHT: 146.6 LBS | SYSTOLIC BLOOD PRESSURE: 112 MMHG | OXYGEN SATURATION: 98 % | HEART RATE: 68 BPM | BODY MASS INDEX: 22.22 KG/M2 | HEIGHT: 68 IN | DIASTOLIC BLOOD PRESSURE: 78 MMHG

## 2020-08-19 DIAGNOSIS — Z95.0 PACEMAKER: Primary | ICD-10-CM

## 2020-08-19 DIAGNOSIS — Z95.0 CARDIAC PACEMAKER IN SITU: Primary | ICD-10-CM

## 2020-08-19 DIAGNOSIS — I44.2 COMPLETE HEART BLOCK (HCC): ICD-10-CM

## 2020-08-19 DIAGNOSIS — I10 ESSENTIAL HYPERTENSION, BENIGN: ICD-10-CM

## 2020-08-19 PROCEDURE — G8420 CALC BMI NORM PARAMETERS: HCPCS | Performed by: INTERNAL MEDICINE

## 2020-08-19 PROCEDURE — 93280 PM DEVICE PROGR EVAL DUAL: CPT

## 2020-08-19 PROCEDURE — 3017F COLORECTAL CA SCREEN DOC REV: CPT | Performed by: INTERNAL MEDICINE

## 2020-08-19 PROCEDURE — G8754 DIAS BP LESS 90: HCPCS | Performed by: INTERNAL MEDICINE

## 2020-08-19 PROCEDURE — G8427 DOCREV CUR MEDS BY ELIG CLIN: HCPCS | Performed by: INTERNAL MEDICINE

## 2020-08-19 PROCEDURE — G8510 SCR DEP NEG, NO PLAN REQD: HCPCS | Performed by: INTERNAL MEDICINE

## 2020-08-19 PROCEDURE — 1101F PT FALLS ASSESS-DOCD LE1/YR: CPT | Performed by: INTERNAL MEDICINE

## 2020-08-19 PROCEDURE — G8536 NO DOC ELDER MAL SCRN: HCPCS | Performed by: INTERNAL MEDICINE

## 2020-08-19 PROCEDURE — G0463 HOSPITAL OUTPT CLINIC VISIT: HCPCS | Performed by: INTERNAL MEDICINE

## 2020-08-19 PROCEDURE — 99215 OFFICE O/P EST HI 40 MIN: CPT | Performed by: INTERNAL MEDICINE

## 2020-08-19 PROCEDURE — G8752 SYS BP LESS 140: HCPCS | Performed by: INTERNAL MEDICINE

## 2020-08-19 NOTE — PROGRESS NOTES
Room 4    Visit Vitals  /78 (BP 1 Location: Left arm, BP Patient Position: Sitting)   Pulse 68   Ht 5' 8\" (1.727 m)   Wt 146 lb 9.6 oz (66.5 kg)   SpO2 98%   BMI 22.29 kg/m²        A little discomfort at device site.      Chest pain: no  Shortness of breath: no  Edema: no  Palpitations: no  Dizziness: no    New diagnosis/Surgeries: no    ER/Hospitalizations:  Battery change 5-5-2020    Refills: NO

## 2020-10-19 ENCOUNTER — TELEPHONE (OUTPATIENT)
Dept: CARDIOLOGY CLINIC | Age: 70
End: 2020-10-19

## 2020-10-19 NOTE — TELEPHONE ENCOUNTER
Pt requesting to speak with the nurse in regards to being referred to trazadone 25 mg by his pcp for not sleeping well. Pt inquiring if taking the medication would have an effect on his pacemaker.  Please advise    Phone: [phone]

## 2020-10-19 NOTE — TELEPHONE ENCOUNTER
Mg Wilkinson NP  You; Smooth Valentine MD 1 hour ago (3:05 PM)         Okay to take trazodone from EP perspective      Called patient, ID verified using two patient identifiers. Informed patient that per NERY Romo NP he may take the Trazodone. Patient verbalizes understanding of all information.

## 2020-12-14 ENCOUNTER — TELEPHONE (OUTPATIENT)
Dept: CARDIOLOGY CLINIC | Age: 70
End: 2020-12-14

## 2020-12-14 ENCOUNTER — OFFICE VISIT (OUTPATIENT)
Dept: CARDIOLOGY CLINIC | Age: 70
End: 2020-12-14
Payer: MEDICARE

## 2020-12-14 DIAGNOSIS — Z95.0 CARDIAC PACEMAKER IN SITU: Primary | ICD-10-CM

## 2020-12-14 PROCEDURE — 93294 REM INTERROG EVL PM/LDLS PM: CPT | Performed by: INTERNAL MEDICINE

## 2020-12-14 NOTE — PROGRESS NOTES
c/ABT dc pacer remote transmission. Normal device function. No recorded episodes. Battery has 10.2 years remaining longevity. Discussed next remote on 3/16/20 - pls send manual transmission to initiate auto sends Q91d. See scanned document for details.

## 2020-12-14 NOTE — TELEPHONE ENCOUNTER
Patient is calling to see if his remote transmission was received. Please advise.     Phone #: [phone]  Thanks

## 2020-12-14 NOTE — TELEPHONE ENCOUNTER
Returned pt's call, Identified using two identifiers. Discussed 12/11/20 remote results - device working properly & no recorded events. Battery life 10.2 years. Send manual transmission to initiate auto sends on 3/16/20. Pt verbalized understanding & appreciative of call.

## 2021-03-12 ENCOUNTER — TELEPHONE (OUTPATIENT)
Dept: CARDIOLOGY CLINIC | Age: 71
End: 2021-03-12

## 2021-03-12 NOTE — TELEPHONE ENCOUNTER
Spoke to pt & walked him thru sending manual transmission since he will be out of town when scheduled.

## 2021-03-12 NOTE — TELEPHONE ENCOUNTER
Patient requesting to reschedule remote check 03/16, states he will be out of town. Requesting it to be done the day before.      Phone:  [phone]

## 2021-03-16 ENCOUNTER — OFFICE VISIT (OUTPATIENT)
Dept: CARDIOLOGY CLINIC | Age: 71
End: 2021-03-16
Payer: MEDICARE

## 2021-03-16 DIAGNOSIS — Z95.0 CARDIAC PACEMAKER IN SITU: Primary | ICD-10-CM

## 2021-03-16 PROCEDURE — 93294 REM INTERROG EVL PM/LDLS PM: CPT | Performed by: INTERNAL MEDICINE

## 2021-03-16 NOTE — LETTER
3/24/2021 8:39 AM 
 
Mr. Marya Asif [address] [facility] [address] [id number] Dear Patient, We have received your recent remote monitor check of your implanted device scheduled on  3/15/2021. Your remaining estimated battery life is 10.1 years  and your device is working normally & appropriately. Your next remote monitor check is scheduled for  6/22/2021. If you have any questions, please call the Pacemaker/ICD clinic at the [facility] location at [phone]. Sincerely, 
 
Tor HENDRIXN, RN Cardiac Device Clinic Coordinator [facility] [address] [facility] [place], [address] [facility] 
[phone]

## 2021-06-22 ENCOUNTER — OFFICE VISIT (OUTPATIENT)
Dept: CARDIOLOGY CLINIC | Age: 71
End: 2021-06-22
Payer: MEDICARE

## 2021-06-22 DIAGNOSIS — Z95.0 CARDIAC PACEMAKER IN SITU: Primary | ICD-10-CM

## 2021-06-22 PROCEDURE — 93294 REM INTERROG EVL PM/LDLS PM: CPT | Performed by: INTERNAL MEDICINE

## 2021-06-22 PROCEDURE — 93296 REM INTERROG EVL PM/IDS: CPT | Performed by: INTERNAL MEDICINE

## 2021-09-24 ENCOUNTER — CLINICAL SUPPORT (OUTPATIENT)
Dept: CARDIOLOGY CLINIC | Age: 71
End: 2021-09-24
Payer: MEDICARE

## 2021-09-24 ENCOUNTER — OFFICE VISIT (OUTPATIENT)
Dept: CARDIOLOGY CLINIC | Age: 71
End: 2021-09-24

## 2021-09-24 VITALS
SYSTOLIC BLOOD PRESSURE: 118 MMHG | WEIGHT: 149 LBS | DIASTOLIC BLOOD PRESSURE: 72 MMHG | BODY MASS INDEX: 22.58 KG/M2 | HEART RATE: 64 BPM | OXYGEN SATURATION: 99 % | HEIGHT: 68 IN

## 2021-09-24 DIAGNOSIS — Z95.0 PACEMAKER: Primary | ICD-10-CM

## 2021-09-24 DIAGNOSIS — Z95.0 CARDIAC PACEMAKER IN SITU: Primary | ICD-10-CM

## 2021-09-24 PROCEDURE — G8420 CALC BMI NORM PARAMETERS: HCPCS | Performed by: NURSE PRACTITIONER

## 2021-09-24 PROCEDURE — G8427 DOCREV CUR MEDS BY ELIG CLIN: HCPCS | Performed by: NURSE PRACTITIONER

## 2021-09-24 PROCEDURE — G8536 NO DOC ELDER MAL SCRN: HCPCS | Performed by: NURSE PRACTITIONER

## 2021-09-24 PROCEDURE — G8754 DIAS BP LESS 90: HCPCS | Performed by: NURSE PRACTITIONER

## 2021-09-24 PROCEDURE — G8752 SYS BP LESS 140: HCPCS | Performed by: NURSE PRACTITIONER

## 2021-09-24 PROCEDURE — G8432 DEP SCR NOT DOC, RNG: HCPCS | Performed by: NURSE PRACTITIONER

## 2021-09-24 PROCEDURE — 93280 PM DEVICE PROGR EVAL DUAL: CPT | Performed by: INTERNAL MEDICINE

## 2021-09-24 PROCEDURE — 99214 OFFICE O/P EST MOD 30 MIN: CPT | Performed by: NURSE PRACTITIONER

## 2021-09-24 PROCEDURE — 1101F PT FALLS ASSESS-DOCD LE1/YR: CPT | Performed by: NURSE PRACTITIONER

## 2021-09-24 PROCEDURE — 3017F COLORECTAL CA SCREEN DOC REV: CPT | Performed by: NURSE PRACTITIONER

## 2021-09-24 RX ORDER — DOXYCYCLINE HYCLATE 50 MG/1
CAPSULE ORAL
COMMUNITY
Start: 2021-09-15

## 2021-09-24 NOTE — PROGRESS NOTES
Room EP4    Visit Vitals  /72 (BP 1 Location: Left upper arm, BP Patient Position: Sitting, BP Cuff Size: Adult)   Pulse 64   Ht 5' 8\" (1.727 m)   Wt 149 lb (67.6 kg)   SpO2 99%   BMI 22.66 kg/m²         Chest pain: no  Shortness of breath: no  Edema: no  Palpitations, Skipped beats, Rapid heartbeat: no  Dizziness: no    New diagnosis/Surgeries: no    ER/Hospitalizations: no    Refills: no

## 2021-09-24 NOTE — PROGRESS NOTES
HISTORY OF PRESENTING ILLNESS      Cary Flynn is a 73 y.o. male  with history of complete heart block s/p dual chamber pacemaker, hypertension, dyslipidemia, right renal mass, ?atrial fibrillation/atrial flutter who underwent generator change and now presents annual device follow up. He denies cardiac complaints.       PAST MEDICAL HISTORY     Past Medical History:   Diagnosis Date    Hyperlipidemia     Other ill-defined conditions(799.89)     right renal mass    Other ill-defined conditions(799.89)     right renal mass    Pacemaker 12    St. Rahul #[id number] dual chamber system  CHB           PAST SURGICAL HISTORY     Past Surgical History:   Procedure Laterality Date    HX NEPHRECTOMY      right partial    HX PACEMAKER          AZ REMVL PERM PM PLS GEN W/REPL PLSE GEN 2 LEAD SYS N/A 2020    Dual chamber Gen change SJM performed by Lelo Obregon MD at [facility] CATH LAB          ALLERGIES     No Known Allergies       FAMILY HISTORY     Family History   Problem Relation Age of Onset    Strabismus Mother     Heart Attack Father     Breast Cancer Sister     negative for cardiac disease       SOCIAL HISTORY     Social History     Socioeconomic History    Marital status:      Spouse name: Not on file    Number of children: Not on file    Years of education: Not on file    Highest education level: Not on file   Tobacco Use    Smoking status: Former Smoker     Packs/day: 0.25     Years: 30.00     Pack years: 7.50     Quit date: 3/11/1980     Years since quittin.5    Smokeless tobacco: Former User   Substance and Sexual Activity    Alcohol use: Yes     Comment: occassionally    Drug use: No     Social Determinants of Health     Financial Resource Strain:     Difficulty of Paying Living Expenses:    Food Insecurity:     Worried About Running Out of Food in the Last Year:     Ran Out of Food in the Last Year:    Transportation Needs:     Lack of Transportation (Medical):  Lack of Transportation (Non-Medical):    Physical Activity:     Days of Exercise per Week:     Minutes of Exercise per Session:    Stress:     Feeling of Stress :    Social Connections:     Frequency of Communication with Friends and Family:     Frequency of Social Gatherings with Friends and Family:     Attends Restoration Services:     Active Member of Clubs or Organizations:     Attends Club or Organization Meetings:     Marital Status:          MEDICATIONS     Current Outpatient Medications   Medication Sig    doxycycline (VIBRAMYCIN) 50 mg capsule TAKE 1 CAPSULE BY MOUTH TWICE DAILY FOR 14 DAYS    aspirin delayed-release 81 mg tablet Take  by mouth daily.  losartan (COZAAR) 25 mg tablet Take 25 mg by mouth daily.  rosuvastatin (CRESTOR) 5 mg tablet Take 5 mg by mouth daily.  PSYLLIUM SEED, WITH SUGAR, (METAMUCIL PO) Take  by mouth. No current facility-administered medications for this visit. I have reviewed the nurses notes, vitals, problem list, allergy list, medical history, family, social history and medications. REVIEW OF SYMPTOMS      General: Pt denies excessive weight gain or loss. Pt is able to conduct ADL's  HEENT: Denies blurred vision, headaches, hearing loss, epistaxis and difficulty swallowing. Respiratory: Denies cough, congestion, shortness of breath, GOTTLIEB, wheezing or stridor. Cardiovascular: Denies precordial pain, palpitations, edema or PND  Gastrointestinal: Denies poor appetite, indigestion, abdominal pain or blood in stool  Genitourinary: Denies hematuria, dysuria, increased urinary frequency  Musculoskeletal: Denies joint pain or swelling from muscles or joints  Neurologic: Denies tremor, paresthesias, headache, or sensory motor disturbance  Psychiatric: Denies confusion, insomnia, depression  Integumentray: Denies rash, itching or ulcers.   Hematologic: Denies easy bruising, bleeding       PHYSICAL EXAMINATION      Vitals: see vitals section  General: Well developed, in no acute distress. HEENT: No jaundice, oral mucosa moist, no oral ulcers  Neck: Supple, no stiffness, no lymphadenopathy, supple  Heart:  Normal S1/S2 negative S3 or S4. Regular, no murmur, gallop or rub, no jugular venous distention  Respiratory: Clear bilaterally x 4, no wheezing or rales  Abdomen:   Soft, non-tender, bowel sounds are active. Extremities:  No edema, normal cap refill, no cyanosis. Musculoskeletal: No clubbing, no deformities  Neuro: A&Ox3, speech clear, gait stable, cooperative, no focal neurologic deficits  Skin: Skin color is normal. No rashes or lesions. Non diaphoretic, moist.  Vascular: 2+ pulses symmetric in all extremities       DIAGNOSTIC DATA      EKG:        LABORATORY DATA      Lab Results   Component Value Date/Time    WBC 5.2 05/05/2020 07:15 AM    Hemoglobin (POC) 15.0 11/26/2012 04:22 PM    HGB 14.1 05/05/2020 07:15 AM    Hematocrit (POC) 44 11/26/2012 04:22 PM    HCT 43.4 05/05/2020 07:15 AM    PLATELET 252 31/16/7750 07:15 AM    MCV 92.5 05/05/2020 07:15 AM      Lab Results   Component Value Date/Time    Sodium 140 05/05/2020 07:15 AM    Potassium 3.8 05/05/2020 07:15 AM    Chloride 107 05/05/2020 07:15 AM    CO2 27 05/05/2020 07:15 AM    Anion gap 6 05/05/2020 07:15 AM    Glucose 89 05/05/2020 07:15 AM    BUN 16 05/05/2020 07:15 AM    Creatinine 1.06 05/05/2020 07:15 AM    BUN/Creatinine ratio 15 05/05/2020 07:15 AM    GFR est AA >60 05/05/2020 07:15 AM    GFR est non-AA >60 05/05/2020 07:15 AM    Calcium 9.1 05/05/2020 07:15 AM    Bilirubin, total 0.5 10/28/2014 08:18 PM    Alk. phosphatase 74 10/28/2014 08:18 PM    Protein, total 7.9 10/28/2014 08:18 PM    Albumin 4.1 10/28/2014 08:18 PM    Globulin 3.8 10/28/2014 08:18 PM    A-G Ratio 1.1 10/28/2014 08:18 PM    ALT (SGPT) 25 10/28/2014 08:18 PM           ASSESSMENT      1. Atrial fibrillation/atrial flutter  2. Hypertension  3. Complete heart block   4. Dyslipidemia  5. Pacemaker   A. SJM   B. Right sided        PLAN     Device interrogation shows normal functioning with 99% RVP. He denies cardiac complaints. Continue current medical therapy and  monitoring in device clinic remotely every 3 months. FOLLOW-UP     1 year      Thank you, Naresh Newton MD for allowing me to participate in the care of this extraordinarily pleasant male. Please do not hesitate to contact me for further questions/concerns.          Yareli Shah MD  Cardiac Electrophysiology / Cardiology    [address]  [address] [facility], [facility], [address], [address] [place] [address], [facility]  [phone] / [phone] Fax   [phone] / [phone] Fax

## 2021-09-24 NOTE — LETTER
9/24/2021    Patient: Cary Flynn   YOB: 1950   Date of Visit: 9/24/2021     Mick Dougherty MD  [address] [place] [address]  Via Fax: [phone]    Dear Mick Dougherty MD,      Thank you for referring Mr. Jailene Koenig to [address] [place] [address] for evaluation. My notes for this consultation are attached. If you have questions, please do not hesitate to call me. I look forward to following your patient along with you.       Sincerely,    Mayela Lilly NP

## 2021-12-07 ENCOUNTER — OFFICE VISIT (OUTPATIENT)
Dept: CARDIOLOGY CLINIC | Age: 71
End: 2021-12-07
Payer: MEDICARE

## 2021-12-07 DIAGNOSIS — Z95.0 CARDIAC PACEMAKER IN SITU: Primary | ICD-10-CM

## 2021-12-07 PROCEDURE — 93294 REM INTERROG EVL PM/LDLS PM: CPT | Performed by: NURSE PRACTITIONER

## 2021-12-07 PROCEDURE — 93296 REM INTERROG EVL PM/IDS: CPT | Performed by: NURSE PRACTITIONER

## 2022-04-04 ENCOUNTER — TELEPHONE (OUTPATIENT)
Dept: CARDIOLOGY CLINIC | Age: 72
End: 2022-04-04

## 2022-04-04 ENCOUNTER — OFFICE VISIT (OUTPATIENT)
Dept: CARDIOLOGY CLINIC | Age: 72
End: 2022-04-04
Payer: MEDICARE

## 2022-04-04 DIAGNOSIS — Z95.0 CARDIAC PACEMAKER IN SITU: Primary | ICD-10-CM

## 2022-04-04 PROCEDURE — 93294 REM INTERROG EVL PM/LDLS PM: CPT | Performed by: INTERNAL MEDICINE

## 2022-04-04 PROCEDURE — 93296 REM INTERROG EVL PM/IDS: CPT | Performed by: INTERNAL MEDICINE

## 2022-04-04 NOTE — LETTER
4/4/2022 3:04 PM    Mr. Luis Carlos Marcos  [address] [facility] [phone]      Dear Mr. Luis Carlos Marcos,    We have received your recent remote monitor check of your implanted device on 4/4/22. Your remaining estimated battery life is 10.8 years and your device is working normally & appropriately. Your next remote monitor check is scheduled for 7/7/2022. This is NOT an in-clinic appointment. This transmission is sent from your home monitor. Please make sure your home monitor is plugged into power and within 10 feet of where you sleep. If you have difficulty sending a transmission, please do NOT call our office. Instead, call tech support for your device as they are better able to assist.    Maddy Line [facility])   [phone]    Your next clinic/office check is scheduled for Friday, 9/23/22 at 10:20 am.  You will have your device checked then see the provider. Please bring a complete list of your medications with strengths and dosages to this appointment. If you have any questions, please call the Pacemaker/ICD clinic at the [facility] location at [phone]. We appreciate you staying remotely connected!     Sincerely,    Michelle Jordan RN, BSN  Device Coordinator  [phone]

## 2022-04-04 NOTE — TELEPHONE ENCOUNTER
Patient is calling to see if we received his elsie transmission on 4.4.2022 at 9:00am. Please Advise.        [phone]

## 2022-07-11 ENCOUNTER — OFFICE VISIT (OUTPATIENT)
Dept: CARDIOLOGY CLINIC | Age: 72
End: 2022-07-11
Payer: MEDICARE

## 2022-07-11 DIAGNOSIS — Z95.0 CARDIAC PACEMAKER IN SITU: Primary | ICD-10-CM

## 2022-07-11 PROCEDURE — 93294 REM INTERROG EVL PM/LDLS PM: CPT | Performed by: INTERNAL MEDICINE

## 2022-07-11 PROCEDURE — 93296 REM INTERROG EVL PM/IDS: CPT | Performed by: INTERNAL MEDICINE

## 2022-07-11 NOTE — LETTER
7/12/2022 11:44 AM    Mr. Marian Cortes  [address] [address]    Dear Mr. Marian Cortes,    We have received your recent remote monitor check of your implanted device on 7/11/22. Thank you for sending it! Your remaining estimated battery life is 8.4 years and your device is working normally & appropriately. We see occasional \"noise\" on the wire in the top chamber of your heart but it is not interfering with the function thus far. I've adjusted your upcoming in-clinic appointment and moved it from September to Wednesday, January 11, 2023 at 1:00 pm so we can get you in with our new physician. We do not have his schedule at this time so there's a chance it may move again. If this time does not work for you, please call to reschedule. Your next remote monitor check is scheduled for 10/18/22. This is NOT an in-clinic appointment. This transmission is sent from your home monitor. Please make sure your home monitor is plugged into power and within 10 feet of where you sleep. If you are using the phone applications, please make sure it is open on your smart phone. If you have difficulty sending a transmission, please do NOT call our office. Instead, call tech support for your device as they are better able to assist.    Merlin ([facility])   [phone]    If you have any questions, please call the Pacemaker/ICD clinic at the [facility] location at [phone]. We appreciate you staying remotely connected!     Sincerely,    Andre Brink RN, BSN  Device Coordinator  [phone]

## 2022-07-12 NOTE — PROGRESS NOTES
chargeable dc pm remote    Normal device function with 50% RA & >99% R pacing. Some RA lead noise known & being monitored for increased episodes. No pacing inhibition noted. See scanned report in  for details.

## 2022-09-22 ENCOUNTER — TRANSCRIBE ORDER (OUTPATIENT)
Dept: REGISTRATION | Age: 72
End: 2022-09-22

## 2022-09-22 ENCOUNTER — HOSPITAL ENCOUNTER (OUTPATIENT)
Dept: GENERAL RADIOLOGY | Age: 72
Discharge: HOME | End: 2022-09-22
Payer: MEDICARE

## 2022-09-22 DIAGNOSIS — R05.9 COUGH, UNSPECIFIED: Primary | ICD-10-CM

## 2022-09-22 DIAGNOSIS — R05.9 COUGH, UNSPECIFIED: ICD-10-CM

## 2022-09-22 PROCEDURE — 71046 X-RAY EXAM CHEST 2 VIEWS: CPT

## 2022-11-04 ENCOUNTER — OFFICE VISIT (OUTPATIENT)
Dept: CARDIOLOGY CLINIC | Age: 72
End: 2022-11-04
Payer: MEDICARE

## 2022-11-04 DIAGNOSIS — Z95.0 CARDIAC PACEMAKER IN SITU: Primary | ICD-10-CM

## 2022-11-08 NOTE — PROGRESS NOTES
Scheduled remote transmission. Device functioning appropriately as programmed, RA lead noise noted  See scanned docments.  Chargeable visit

## 2023-01-25 ENCOUNTER — OFFICE VISIT (OUTPATIENT)
Dept: CARDIOLOGY CLINIC | Age: 73
End: 2023-01-25
Payer: MEDICARE

## 2023-01-25 ENCOUNTER — OFFICE VISIT (OUTPATIENT)
Dept: CARDIOLOGY CLINIC | Age: 73
End: 2023-01-25

## 2023-01-25 VITALS
HEIGHT: 68 IN | OXYGEN SATURATION: 97 % | BODY MASS INDEX: 22.66 KG/M2 | SYSTOLIC BLOOD PRESSURE: 110 MMHG | HEART RATE: 60 BPM | DIASTOLIC BLOOD PRESSURE: 64 MMHG | RESPIRATION RATE: 16 BRPM

## 2023-01-25 DIAGNOSIS — Z95.0 PACEMAKER: ICD-10-CM

## 2023-01-25 DIAGNOSIS — E78.5 DYSLIPIDEMIA: ICD-10-CM

## 2023-01-25 DIAGNOSIS — Z95.0 CARDIAC PACEMAKER IN SITU: Primary | ICD-10-CM

## 2023-01-25 DIAGNOSIS — I10 ESSENTIAL HYPERTENSION, BENIGN: ICD-10-CM

## 2023-01-25 DIAGNOSIS — I44.2 COMPLETE HEART BLOCK (HCC): Primary | ICD-10-CM

## 2023-01-25 NOTE — PROGRESS NOTES
Cardiac Electrophysiology Office Follow-up    NAME: Aisha Coulter   :  1950  MRM:  [id number]    Date:  2023         Assessment and Plan:     1. Complete heart block (HCC)  -     AMB POC EKG ROUTINE W/ 12 LEADS, INTER & REP  2. Dyslipidemia  -     AMB POC EKG ROUTINE W/ 12 LEADS, INTER & REP  3. Pacemaker  -     AMB POC EKG ROUTINE W/ 12 LEADS, INTER & REP  4. Essential hypertension, benign  -     AMB POC EKG ROUTINE W/ 12 LEADS, INTER & REP     Complete heart block  Pacemaker dependent, ventricular pacing more than 99% of the time. - Normal device interrogation as noted below  - Follow-up with NP in 1 year  - Follow-up with me in 18 months    Pacemaker  [address] dual chamber pacemaker with normal function. Battery life 8 years. No new or significant lead issues requiring intervention. No significant arrhythmias noted requiring intervention. Iterative programing was performed to assess lead parameters without any permanent changes. Atrial pacing 42%, ventricular pacing more than 99%. No atrial fibrillation detected. - Remote transmission every 3 months    Hypertension  BP is well controlled on the current regimen. No change in antihypertensive medications today. Recommended routine exercise and low sodium diet. - Continue losartan 25 mg daily    Hyperlipidemia  Continue Crestor 5 mg daily                   ATTENTION:   This medical record was transcribed using an electronic medical records/speech recognition system. Although proofread, it may and can contain electronic, spelling and other errors. Corrections may be executed at a later time. Please feel free to contact us for any clarifications as needed. Subjective:     Aisha Coulter, a 81y.o. year-old who presents for followup of heart block and PPM.    Prior patient of Dr. Jhony Salazar. history of complete heart block s/p dual chamber pacemaker, hypertension, dyslipidemia, right renal mass, ?atrial fibrillation/atrial flutter. Interrogation reveals normal functioning. No hospitalizations, no CP, SOB. Review of Systems:   12 point review of systems was performed. All negative except for HPI    Exam:     Physical Exam:  /64 (BP 1 Location: Left upper arm, BP Patient Position: Sitting, BP Cuff Size: Adult)   Pulse 60   Resp 16   Ht 5' 8\" (1.727 m)   SpO2 97%   BMI 22.66 kg/m²     PHYSICAL EXAM  General:  Alert and oriented, in no acute distress  Head:  Atraumatic, normocephalic  Eyes:  extraocular muscles intact  Neck:  Supple, normal range of motion  Lungs:  Clear to auscultation bilaterally, no wheezes/rales/rhonchi   Cardiovascular:  Regular rate and rhythm, normal S1-S2, no murmurs/rubs/gallops  Abdomen:  Soft, nontender, nondistended, normoactive bowel sounds  Skin:  Intact, no rash; left pectoral site clean dry intact well-healed incision  Extremities:, no clubbing, cyanosis, or edema  Musculoskeletal: normal range of motion  Neurological:  Alert and oriented, no focal neurologic deficits  Psychiatric:  Normal mood and affect      Medications:     Current Outpatient Medications   Medication Sig    aspirin delayed-release 81 mg tablet Take  by mouth daily. losartan (COZAAR) 25 mg tablet Take 25 mg by mouth daily. PSYLLIUM SEED, WITH SUGAR, (METAMUCIL PO) Take  by mouth. doxycycline (VIBRAMYCIN) 50 mg capsule TAKE 1 CAPSULE BY MOUTH TWICE DAILY FOR 14 DAYS (Patient not taking: Reported on 1/25/2023)    rosuvastatin (CRESTOR) 5 mg tablet Take 5 mg by mouth daily. No current facility-administered medications for this visit.       Diagnostic Data Review:       Results for orders placed or performed during the hospital encounter of 11/26/12   EKG, 12 LEAD, INITIAL   Result Value Ref Range    Ventricular Rate 60 BPM    Atrial Rate 60 BPM    P-R Interval 184 ms    QRS Duration 168 ms    Q-T Interval 496 ms    QTC Calculation (Bezet) 496 ms    Calculated P Axis 50 degrees    Calculated R Axis -71 degrees Calculated T Axis 63 degrees    Diagnosis                 Confirmed by Tristan Valentine ([id number]) on 11/27/2012 1:49:05 PM      ECHO: 11/26/12: EF 50-55%, mild MR, TR  Pacemaker: 11/27/2012: St. Rahul lead, model number 2088, serial number [id number]  Lexiscan cardiolite - 12/21/12 Normal perfusion, EF 56%  5/5/2020: SJM Pacemaker Generator Change per Dr. Mil Freitas      Lab Review:   No results found for: CHOL, CHOLX, CHLST, CHOLV, 862475, HDL, HDLP, LDL, LDLC, DLDLP, TGLX, TRIGL, TRIGP, CHHD, [facility]  Lab Results   Component Value Date/Time    Creatinine (POC) 1.3 11/26/2012 04:22 PM    Creatinine 1.06 05/05/2020 07:15 AM     Lab Results   Component Value Date/Time    BUN 16 05/05/2020 07:15 AM    BUN (POC) 16 11/26/2012 04:22 PM     Lab Results   Component Value Date/Time    Potassium 3.8 05/05/2020 07:15 AM     No results found for: HBA1C, MFJ5CEPC, GGK8EJFW  Lab Results   Component Value Date/Time    Hemoglobin (POC) 15.0 11/26/2012 04:22 PM    HGB 14.1 05/05/2020 07:15 AM     Lab Results   Component Value Date/Time    PLATELET 991 63/76/2462 07:15 AM     No results for input(s): CPK, CKMB, TROIQ in the last 72 hours. No lab exists for component: CKQMB, CPKMB             ___________________________________________________    An Lena Hernandez MD, [facility], [address] [facility] [place]  [address] [facility], [address]                             [phone]     [address] [place] [address].  [address] [place] [address], [address] [facility]  [phone]

## 2023-04-25 ENCOUNTER — OFFICE VISIT (OUTPATIENT)
Dept: CARDIOLOGY CLINIC | Age: 73
End: 2023-04-25
Payer: MEDICARE

## 2023-04-25 DIAGNOSIS — Z95.0 PACEMAKER: Primary | ICD-10-CM

## 2023-04-25 PROCEDURE — 93296 REM INTERROG EVL PM/IDS: CPT | Performed by: INTERNAL MEDICINE

## 2023-04-25 PROCEDURE — 93294 REM INTERROG EVL PM/LDLS PM: CPT | Performed by: INTERNAL MEDICINE

## 2023-05-12 ENCOUNTER — TELEPHONE (OUTPATIENT)
Age: 73
End: 2023-05-12

## 2023-05-12 NOTE — TELEPHONE ENCOUNTER
Returned call, ID verified using two patient identifiers     Pt states he is traveling out of the country for 5 weeks, will be back late June. Next remote is scheduled for 7/25/23, follow up as scheduled upon return. Opportunities for questions, clarifications, and concerns provided. Pt expressed understanding.

## 2023-05-12 NOTE — TELEPHONE ENCOUNTER
Pt called and stated he would be traveling out the country for a month and would like to speak to someone,please advise    Pt # [phone]

## 2023-08-04 ENCOUNTER — OFFICE VISIT (OUTPATIENT)
Facility: CLINIC | Age: 73
End: 2023-08-04
Payer: MEDICARE

## 2023-08-04 VITALS
DIASTOLIC BLOOD PRESSURE: 89 MMHG | RESPIRATION RATE: 16 BRPM | TEMPERATURE: 97.7 F | HEART RATE: 69 BPM | BODY MASS INDEX: 22.73 KG/M2 | WEIGHT: 150 LBS | OXYGEN SATURATION: 97 % | HEIGHT: 68 IN | SYSTOLIC BLOOD PRESSURE: 128 MMHG

## 2023-08-04 DIAGNOSIS — I10 ESSENTIAL HYPERTENSION, BENIGN: ICD-10-CM

## 2023-08-04 DIAGNOSIS — I44.2 COMPLETE HEART BLOCK (HCC): ICD-10-CM

## 2023-08-04 DIAGNOSIS — Z95.0 PACEMAKER: ICD-10-CM

## 2023-08-04 DIAGNOSIS — Z12.5 PROSTATE CANCER SCREENING: ICD-10-CM

## 2023-08-04 DIAGNOSIS — E78.5 DYSLIPIDEMIA: ICD-10-CM

## 2023-08-04 DIAGNOSIS — E78.5 DYSLIPIDEMIA: Primary | ICD-10-CM

## 2023-08-04 LAB
ALBUMIN SERPL-MCNC: 3.8 G/DL (ref 3.5–5)
ALBUMIN/GLOB SERPL: 1.1 (ref 1.1–2.2)
ALP SERPL-CCNC: 64 U/L (ref 45–117)
ALT SERPL-CCNC: 32 U/L (ref 12–78)
ANION GAP SERPL CALC-SCNC: 1 MMOL/L (ref 5–15)
AST SERPL-CCNC: 26 U/L (ref 15–37)
BILIRUB SERPL-MCNC: 0.6 MG/DL (ref 0.2–1)
BUN SERPL-MCNC: 15 MG/DL (ref 6–20)
BUN/CREAT SERPL: 13 (ref 12–20)
CALCIUM SERPL-MCNC: 9.2 MG/DL (ref 8.5–10.1)
CHLORIDE SERPL-SCNC: 107 MMOL/L (ref 97–108)
CHOLEST SERPL-MCNC: 127 MG/DL
CO2 SERPL-SCNC: 28 MMOL/L (ref 21–32)
CREAT SERPL-MCNC: 1.12 MG/DL (ref 0.7–1.3)
ERYTHROCYTE [DISTWIDTH] IN BLOOD BY AUTOMATED COUNT: 12.1 % (ref 11.5–14.5)
GLOBULIN SER CALC-MCNC: 3.4 G/DL (ref 2–4)
GLUCOSE SERPL-MCNC: 83 MG/DL (ref 65–100)
HCT VFR BLD AUTO: 44.3 % (ref 36.6–50.3)
HDLC SERPL-MCNC: 54 MG/DL
HDLC SERPL: 2.4 (ref 0–5)
HGB BLD-MCNC: 14.2 G/DL (ref 12.1–17)
LDLC SERPL CALC-MCNC: 57.2 MG/DL (ref 0–100)
MCH RBC QN AUTO: 30.4 PG (ref 26–34)
MCHC RBC AUTO-ENTMCNC: 32.1 G/DL (ref 30–36.5)
MCV RBC AUTO: 94.9 FL (ref 80–99)
NRBC # BLD: 0 K/UL (ref 0–0.01)
NRBC BLD-RTO: 0 PER 100 WBC
PLATELET # BLD AUTO: 207 K/UL (ref 150–400)
PMV BLD AUTO: 10.6 FL (ref 8.9–12.9)
POTASSIUM SERPL-SCNC: 5.2 MMOL/L (ref 3.5–5.1)
PROT SERPL-MCNC: 7.2 G/DL (ref 6.4–8.2)
PSA SERPL-MCNC: 1.9 NG/ML (ref 0.01–4)
RBC # BLD AUTO: 4.67 M/UL (ref 4.1–5.7)
SODIUM SERPL-SCNC: 136 MMOL/L (ref 136–145)
TRIGL SERPL-MCNC: 79 MG/DL
VLDLC SERPL CALC-MCNC: 15.8 MG/DL
WBC # BLD AUTO: 5.4 K/UL (ref 4.1–11.1)

## 2023-08-04 PROCEDURE — 1123F ACP DISCUSS/DSCN MKR DOCD: CPT | Performed by: FAMILY MEDICINE

## 2023-08-04 PROCEDURE — 99204 OFFICE O/P NEW MOD 45 MIN: CPT | Performed by: FAMILY MEDICINE

## 2023-08-04 PROCEDURE — 3079F DIAST BP 80-89 MM HG: CPT | Performed by: FAMILY MEDICINE

## 2023-08-04 PROCEDURE — 3074F SYST BP LT 130 MM HG: CPT | Performed by: FAMILY MEDICINE

## 2023-08-04 RX ORDER — ROSUVASTATIN CALCIUM 10 MG/1
20 TABLET, COATED ORAL DAILY
COMMUNITY

## 2023-08-04 NOTE — PROGRESS NOTES
Family Medicine Initial Office Visit  Patient: Shailesh Nagy  1950, 72 y.o., male  Encounter Date: 2023    ASSESSMENT & PLAN  Shailesh Nagy is a 72 y.o. male with pacemaker from heart blocker, who presented for established care with below concerns:    1. Dyslipidemia  -     Lipid Panel; Future  -     Lipoprotein NMR; Future  -     Lipoprotein A (LPA); Future  -     Apolipoprotein B-100; Future  -     Comprehensive Metabolic Panel; Future  2. Essential hypertension, benign  Assessment & Plan:   Well-controlled, continue current medications and continue current treatment plan  Orders:  -     CBC; Future  -     Comprehensive Metabolic Panel; Future  3. Complete heart block (HCC)  Assessment & Plan:   Monitored by specialist- no acute findings meriting change in the plan  Orders:  -     CBC; Future  -     Comprehensive Metabolic Panel; Future  4. Prostate cancer screening  -     PSA Screening; Future  5. Pacemaker  Assessment & Plan:   Monitored by specialist- no acute findings meriting change in the plan        No follow-ups on file. There are no Patient Instructions on file for this visit. CHIEF COMPLAINT  Chief Complaint   Patient presents with    New Patient     No health concerns at this time         SUBJECTIVE  Shailesh Nagy is a 72 y.o. male presenting today for establishing care. Patient retired from work as . Patient hobbies include traveling, spending time with family. Originally from [place]. Patient lives in a house with Cesar Zavala his wife ( 40 years). Patient was last seen by primary care last year. Patient last saw a dentist last year, appointment next week. Exercise: moderate  Diet / Weight: overall healthy.     Social History     Tobacco Use   Smoking Status Former    Packs/day: 0.25    Types: Cigarettes    Quit date: 3/11/1980    Years since quittin.4   Smokeless Tobacco Former     Social History     Substance and Sexual Activity   Alcohol Use Yes     Social

## 2023-08-06 LAB
APO B SERPL-MCNC: 74 MG/DL
CHOLEST SERPL-MCNC: 147 MG/DL (ref 100–199)
HDL SERPL-SCNC: 33.5 UMOL/L
HDLC SERPL-MCNC: 51 MG/DL
LDL SERPL QN: 21.7 NM
LDL SERPL-SCNC: 909 NMOL/L
LDL SMALL SERPL-SCNC: 306 NMOL/L
LDLC SERPL CALC-MCNC: 80 MG/DL (ref 0–99)
LP-IR SCORE SERPL: <25
TRIGL SERPL-MCNC: 86 MG/DL (ref 0–149)

## 2023-08-07 ENCOUNTER — PATIENT MESSAGE (OUTPATIENT)
Facility: CLINIC | Age: 73
End: 2023-08-07

## 2023-08-07 DIAGNOSIS — I10 ESSENTIAL HYPERTENSION, BENIGN: Primary | ICD-10-CM

## 2023-08-07 LAB — LPA SERPL-SCNC: 205.1 NMOL/L

## 2023-08-08 NOTE — TELEPHONE ENCOUNTER
From: Martinez Degroot  To: Dr. Ruben Bishop  Sent: 8/7/2023 9:31 PM EDT  Subject: [place] test result    My result is 1 nmol/lit when the target is 5-15. What is this about? Is this serious or urgent. What do I need to do regarding this? I will appreciate a quick reply.     Rex Krishnamurthy

## 2024-01-22 ENCOUNTER — TELEPHONE (OUTPATIENT)
Age: 74
End: 2024-01-22

## 2024-01-22 NOTE — TELEPHONE ENCOUNTER
1/22/24 MEIR for pt of appt time and day change from 1/26/24 to 2/13/24 [facility] [phone]

## 2024-01-30 ENCOUNTER — TELEPHONE (OUTPATIENT)
Facility: CLINIC | Age: 74
End: 2024-01-30

## 2024-01-30 DIAGNOSIS — E78.5 DYSLIPIDEMIA: Primary | ICD-10-CM

## 2024-01-30 NOTE — TELEPHONE ENCOUNTER
Pt called office.     Will be going to [facility] lab tomorrow, for his 02/05/2025 appointment.     Pt asks to have his Lipoprotein lab redrawn.

## 2024-02-01 DIAGNOSIS — E78.5 DYSLIPIDEMIA: ICD-10-CM

## 2024-02-03 LAB
APO B SERPL-MCNC: 69 MG/DL
CHOLEST SERPL-MCNC: 139 MG/DL (ref 100–199)
HDL SERPL-SCNC: 34.4 UMOL/L
HDLC SERPL-MCNC: 50 MG/DL
LDL SERPL QN: 21 NM
LDL SERPL-SCNC: 889 NMOL/L
LDL SMALL SERPL-SCNC: 288 NMOL/L
LDLC SERPL CALC-MCNC: 74 MG/DL (ref 0–99)
LP-IR SCORE SERPL: 35
LPA SERPL-SCNC: 196.2 NMOL/L
TRIGL SERPL-MCNC: 77 MG/DL (ref 0–149)

## 2024-02-05 ENCOUNTER — OFFICE VISIT (OUTPATIENT)
Facility: CLINIC | Age: 74
End: 2024-02-05
Payer: MEDICARE

## 2024-02-05 VITALS
BODY MASS INDEX: 23.19 KG/M2 | HEART RATE: 63 BPM | OXYGEN SATURATION: 99 % | SYSTOLIC BLOOD PRESSURE: 133 MMHG | HEIGHT: 68 IN | WEIGHT: 153 LBS | TEMPERATURE: 97.2 F | DIASTOLIC BLOOD PRESSURE: 64 MMHG

## 2024-02-05 DIAGNOSIS — Z95.0 PACEMAKER: ICD-10-CM

## 2024-02-05 DIAGNOSIS — N28.89 RIGHT RENAL MASS: ICD-10-CM

## 2024-02-05 DIAGNOSIS — E78.5 DYSLIPIDEMIA: ICD-10-CM

## 2024-02-05 DIAGNOSIS — I44.2 COMPLETE HEART BLOCK (HCC): ICD-10-CM

## 2024-02-05 DIAGNOSIS — I10 ESSENTIAL HYPERTENSION, BENIGN: Primary | ICD-10-CM

## 2024-02-05 DIAGNOSIS — Z12.5 PROSTATE CANCER SCREENING: ICD-10-CM

## 2024-02-05 PROCEDURE — 1123F ACP DISCUSS/DSCN MKR DOCD: CPT | Performed by: FAMILY MEDICINE

## 2024-02-05 PROCEDURE — 3078F DIAST BP <80 MM HG: CPT | Performed by: FAMILY MEDICINE

## 2024-02-05 PROCEDURE — 99214 OFFICE O/P EST MOD 30 MIN: CPT | Performed by: FAMILY MEDICINE

## 2024-02-05 PROCEDURE — 3075F SYST BP GE 130 - 139MM HG: CPT | Performed by: FAMILY MEDICINE

## 2024-02-05 RX ORDER — ROSUVASTATIN CALCIUM 20 MG/1
20 TABLET, COATED ORAL DAILY
COMMUNITY
Start: 2023-11-13

## 2024-02-05 ASSESSMENT — PATIENT HEALTH QUESTIONNAIRE - PHQ9
SUM OF ALL RESPONSES TO PHQ9 QUESTIONS 1 & 2: 0
SUM OF ALL RESPONSES TO PHQ QUESTIONS 1-9: 0
1. LITTLE INTEREST OR PLEASURE IN DOING THINGS: 0
SUM OF ALL RESPONSES TO PHQ QUESTIONS 1-9: 0
2. FEELING DOWN, DEPRESSED OR HOPELESS: 0
SUM OF ALL RESPONSES TO PHQ QUESTIONS 1-9: 0
SUM OF ALL RESPONSES TO PHQ QUESTIONS 1-9: 0

## 2024-02-05 NOTE — PROGRESS NOTES
SUBJECTIVES  with respective ASSESSMENT/PLAN:  Mr. Ariane Chacon is a 73 y.o. male established patient who presents for 6 Month Follow-Up  , found to have the followin. Essential hypertension, benign  Overview:  BP Readings from Last 3 Encounters:   24 133/64   23 128/89   23 110/64     Medication: Losartan 25mg daily    Interval Hx: doing well, asymptomatic.  Assessment & Plan:   Well-controlled, continue current medications and continue current treatment plan  Orders:  -     CBC; Future  -     Comprehensive Metabolic Panel; Future  2. Dyslipidemia  Overview:  Lab Results   Component Value Date    CHOL 139 2024    TRIG 77 2024    HDL 50 2024    LDLCALC 74 2024    CHOLHDLRATIO 2.4 2023     Medication: Crestor 20mg daily    Specialist: Cardiology outside    Interval Hx:  - doing well, lipoprotein B 100 is lower in good range too.  Assessment & Plan:   Well-controlled, continue current medications and continue current treatment plan  Orders:  -     Lipid Panel; Future  3. Complete heart block (HCC)  Overview:  12  [employer] #[id number] dual chamber system  Followed by Dr. Vega of Cardiology here in [place]. Also followed by [facility].    Assessment & Plan:   Monitored by specialist- no acute findings meriting change in the plan  4. Pacemaker  Overview:  For complete heart block. Followed by Dr. Vega of electrocardiology and [facility].  Assessment & Plan:   Monitored by specialist- no acute findings meriting change in the plan  5. Right renal mass  Overview:  Followed by [facility]  Assessment & Plan:   Monitored by specialist- no acute findings meriting change in the plan  6. Prostate cancer screening  -     PSA Screening; Future        It was a pleasure seeing Mr. Ariane Chacon today.    No follow-ups on file.      The following portions of the patient's history were reviewed and updated as appropriate: allergies, current medications, family history, medical

## 2024-02-05 NOTE — PROGRESS NOTES
Identified pt with two pt identifiers(name and ). Reviewed record in preparation for visit and have obtained necessary documentation. All patient medications has been reviewed.  Chief Complaint   Patient presents with    6 Month Follow-Up       Vitals:    24 0759   BP: (!) 140/77   Pulse: 63   Temp: 97.2 °F (36.2 °C)   SpO2: 99%                   Coordination of Care Questionnaire:   1) Have you been to an emergency room, urgent care, or hospitalized since your last visit?   no    2. Have seen or consulted any other health care provider since your last visit? no

## 2024-02-12 NOTE — PROGRESS NOTES
Cardiac Electrophysiology Office Follow-up    NAME:  Ariane Chacon   :  1950  MRM:  [id number]    Date:  2024     Primary Cardiologist:     Assessment and Plan:     1. Complete heart block (HCC)  2. Pacemaker  3. Essential hypertension, benign  4. Dyslipidemia       Complete heart block: Pacemaker dependent, ventricular pacing more than 99% of the time.  - Normal device interrogation as noted below  - Follow-up with me in 6 months    [employer] dual chamber pacemaker (DOI generator 2020, leads 2012): Remote device check 24 with normal function. Battery life 6.8 years. No new or significant lead issues requiring intervention. No significant arrhythmias noted requiring intervention. Iterative programing was performed to assess lead parameters without any permanent changes.  Atrial pacing 46%, ventricular pacing >99%.  No atrial fibrillation detected.  - Remote transmission every 3 months    Hypertension: BP is well controlled on the current regimen. No change in antihypertensive medications today. Recommended routine exercise and low sodium diet.  - Continue losartan 25 mg daily    Hyperlipidemia: Continue Crestor 5 mg daily                    Subjective:     Ariane Chacon, a 73 y.o. year-old who presents for followup of heart block and PPM.     Prior patient of Dr. Pina. history of complete heart block s/p dual chamber pacemaker, hypertension, dyslipidemia, right renal mass, ?atrial fibrillation/atrial flutter. Interrogation reveals normal functioning. No hospitalizations, no CP, SOB.      Review of Systems:   12 point review of systems was performed. All negative except for HPI    Past Medical History:   Diagnosis Date    Hyperlipidemia     Other ill-defined conditions(799.89)     right renal mass    Other ill-defined conditions(799.89)     right renal mass    Pacemaker 12    [employer] #[id number] dual chamber system  CHB     Past Surgical History:   Procedure Laterality Date    KIDNEY

## 2024-02-13 ENCOUNTER — OFFICE VISIT (OUTPATIENT)
Age: 74
End: 2024-02-13
Payer: MEDICARE

## 2024-02-13 VITALS
SYSTOLIC BLOOD PRESSURE: 118 MMHG | BODY MASS INDEX: 23.28 KG/M2 | HEART RATE: 68 BPM | HEIGHT: 68 IN | RESPIRATION RATE: 14 BRPM | WEIGHT: 153.6 LBS | DIASTOLIC BLOOD PRESSURE: 84 MMHG | OXYGEN SATURATION: 97 %

## 2024-02-13 DIAGNOSIS — I10 ESSENTIAL HYPERTENSION, BENIGN: ICD-10-CM

## 2024-02-13 DIAGNOSIS — E78.5 DYSLIPIDEMIA: ICD-10-CM

## 2024-02-13 DIAGNOSIS — I44.2 COMPLETE HEART BLOCK (HCC): Primary | ICD-10-CM

## 2024-02-13 DIAGNOSIS — Z95.0 PACEMAKER: ICD-10-CM

## 2024-02-13 PROCEDURE — 99214 OFFICE O/P EST MOD 30 MIN: CPT

## 2024-02-13 PROCEDURE — 1123F ACP DISCUSS/DSCN MKR DOCD: CPT

## 2024-02-13 PROCEDURE — 3079F DIAST BP 80-89 MM HG: CPT

## 2024-02-13 PROCEDURE — 3074F SYST BP LT 130 MM HG: CPT

## 2024-02-13 NOTE — PROGRESS NOTES
Room #: EP 3    Chief Complaint   Patient presents with    Other     CHB- SJM ppm    Annual Exam     /84 (Site: Left Upper Arm, Position: Sitting, Cuff Size: Medium Adult)   Pulse 68   Resp 14   Ht 1.727 m (5' 8\")   Wt 69.7 kg (153 lb 9.6 oz)   SpO2 97%   BMI 23.35 kg/m²       Chest pain: NO       1. Have you been to the ER, urgent care clinic outside of [facility] since your last visit?  Hospitalized since your last visit?  NO        Refills:  NO

## 2024-06-17 ENCOUNTER — TELEPHONE (OUTPATIENT)
Facility: CLINIC | Age: 74
End: 2024-06-17

## 2024-06-17 NOTE — TELEPHONE ENCOUNTER
Pt came in with labs that he had done through [facility]. He wanted me to make a copy to give to Dr Mejia. I offered to make an appointment to discuss but he wanted me to send a message instead asking for a call.   Copy of labs are in Dr Roberto Paz [phone]

## 2024-06-18 ENCOUNTER — TELEPHONE (OUTPATIENT)
Facility: CLINIC | Age: 74
End: 2024-06-18

## 2024-06-18 NOTE — TELEPHONE ENCOUNTER
Pt came in still very concerned with a lab result he had gotten from the [facility]. Asked if he could talk to the dr today, advised that the dr was in pt care at the moment. Made pt an appointment for next Monday 6/24, as he is very worried.

## 2024-06-21 SDOH — ECONOMIC STABILITY: FOOD INSECURITY: WITHIN THE PAST 12 MONTHS, YOU WORRIED THAT YOUR FOOD WOULD RUN OUT BEFORE YOU GOT MONEY TO BUY MORE.: NEVER TRUE

## 2024-06-21 SDOH — ECONOMIC STABILITY: FOOD INSECURITY: WITHIN THE PAST 12 MONTHS, THE FOOD YOU BOUGHT JUST DIDN'T LAST AND YOU DIDN'T HAVE MONEY TO GET MORE.: NEVER TRUE

## 2024-06-21 SDOH — ECONOMIC STABILITY: INCOME INSECURITY: HOW HARD IS IT FOR YOU TO PAY FOR THE VERY BASICS LIKE FOOD, HOUSING, MEDICAL CARE, AND HEATING?: NOT HARD AT ALL

## 2024-06-21 SDOH — ECONOMIC STABILITY: HOUSING INSECURITY
IN THE LAST 12 MONTHS, WAS THERE A TIME WHEN YOU DID NOT HAVE A STEADY PLACE TO SLEEP OR SLEPT IN A SHELTER (INCLUDING NOW)?: NO

## 2024-06-21 SDOH — ECONOMIC STABILITY: TRANSPORTATION INSECURITY
IN THE PAST 12 MONTHS, HAS LACK OF TRANSPORTATION KEPT YOU FROM MEETINGS, WORK, OR FROM GETTING THINGS NEEDED FOR DAILY LIVING?: NO

## 2024-06-24 ENCOUNTER — OFFICE VISIT (OUTPATIENT)
Facility: CLINIC | Age: 74
End: 2024-06-24

## 2024-06-24 VITALS
RESPIRATION RATE: 16 BRPM | OXYGEN SATURATION: 99 % | TEMPERATURE: 97.8 F | HEIGHT: 68 IN | DIASTOLIC BLOOD PRESSURE: 71 MMHG | SYSTOLIC BLOOD PRESSURE: 128 MMHG | HEART RATE: 53 BPM | WEIGHT: 150 LBS | BODY MASS INDEX: 22.73 KG/M2

## 2024-06-24 DIAGNOSIS — Z11.59 ENCOUNTER FOR SCREENING FOR OTHER VIRAL DISEASES: ICD-10-CM

## 2024-06-24 DIAGNOSIS — E78.5 DYSLIPIDEMIA: ICD-10-CM

## 2024-06-24 DIAGNOSIS — Z72.89 OTHER PROBLEMS RELATED TO LIFESTYLE: ICD-10-CM

## 2024-06-24 DIAGNOSIS — Z12.5 PROSTATE CANCER SCREENING: ICD-10-CM

## 2024-06-24 DIAGNOSIS — I10 ESSENTIAL HYPERTENSION, BENIGN: ICD-10-CM

## 2024-06-24 DIAGNOSIS — M77.42 METATARSALGIA OF LEFT FOOT: Primary | ICD-10-CM

## 2024-06-24 DIAGNOSIS — Z11.59 NEED FOR HEPATITIS B SCREENING TEST: ICD-10-CM

## 2024-06-24 DIAGNOSIS — R76.8 HEPATITIS B CORE ANTIBODY POSITIVE: ICD-10-CM

## 2024-06-24 RX ORDER — LOSARTAN POTASSIUM 25 MG/1
25 TABLET ORAL DAILY
Qty: 90 TABLET | Refills: 1 | Status: SHIPPED | OUTPATIENT
Start: 2024-06-24

## 2024-06-24 RX ORDER — ROSUVASTATIN CALCIUM 20 MG/1
20 TABLET, COATED ORAL DAILY
Qty: 90 TABLET | Refills: 1 | Status: SHIPPED | OUTPATIENT
Start: 2024-06-24

## 2024-06-24 SDOH — ECONOMIC STABILITY: FOOD INSECURITY: WITHIN THE PAST 12 MONTHS, YOU WORRIED THAT YOUR FOOD WOULD RUN OUT BEFORE YOU GOT MONEY TO BUY MORE.: NEVER TRUE

## 2024-06-24 SDOH — ECONOMIC STABILITY: FOOD INSECURITY: WITHIN THE PAST 12 MONTHS, THE FOOD YOU BOUGHT JUST DIDN'T LAST AND YOU DIDN'T HAVE MONEY TO GET MORE.: NEVER TRUE

## 2024-06-24 SDOH — ECONOMIC STABILITY: INCOME INSECURITY: HOW HARD IS IT FOR YOU TO PAY FOR THE VERY BASICS LIKE FOOD, HOUSING, MEDICAL CARE, AND HEATING?: NOT HARD AT ALL

## 2024-06-24 NOTE — ASSESSMENT & PLAN NOTE
Likely false positive. Or prior exposure, even in-utero. Will double check titers. Reassured and educated extensively on condition.

## 2024-06-24 NOTE — PROGRESS NOTES
SUBJECTIVES  with respective ASSESSMENT/PLAN:  Mr. Ariane Chacon is a 74 y.o. male established patient who presents for Foot Pain (1st digit mip and plantar pains for about a month no alysa,  no hx of gout. Pain has subside some in the last few days) and Follow-up (Got a letter from  [facility] needs to be check from hcv)  , found to have the followin. Metatarsalgia of left foot  Overview:  Couple months of medial aspect 1st metatarsal distal head associated with walking, improved with rest, shifting directions seems to worsen too. No prior injury, not red/swollen or \"on fire.\"    Patient has noticed changing shoes in the past couple days to more comfortable foot wear has significantly helped.  Assessment & Plan:    Improving, continue using softer shoe, conservative cares.  2. Hepatitis B core antibody positive  Overview:  Found positive, other testing negative at [facility]. Other communicable diseases such as HIV, syphilis, hep C negative. Asymptomatic, no risk factors.  Assessment & Plan:  Likely false positive. Or prior exposure, even in-utero. Will double check titers. Reassured and educated extensively on condition.         It was a pleasure seeing Mr. Ariane Chacon today.    No follow-ups on file.      The following portions of the patient's history were reviewed and updated as appropriate: allergies, current medications, family history, medical history, social history, surgical history and problem list.    OBJECTIVE FINDINGS:    /71   Pulse 53   Temp 97.8 °F (36.6 °C)   Resp 16   Ht 1.727 m (5' 8\")   Wt 68 kg (150 lb)   SpO2 99%   BMI 22.81 kg/m²     Physical exam:  Constitutional: well-appearing, no acute distress  Eyes: EOM intact. PERRL bilateral. Not icteric.  Cardiac: normal rate, regular rhythm.  Pulm: normal rate, normal effort.  Skin: normal color and turgor.  Lower extrem: no edema. Mild point tenderness with out associated visual changes or palpable differences. At point

## 2024-07-23 DIAGNOSIS — Z72.89 OTHER PROBLEMS RELATED TO LIFESTYLE: ICD-10-CM

## 2024-07-23 DIAGNOSIS — Z12.5 PROSTATE CANCER SCREENING: ICD-10-CM

## 2024-07-23 DIAGNOSIS — I10 ESSENTIAL HYPERTENSION, BENIGN: ICD-10-CM

## 2024-07-23 DIAGNOSIS — E78.5 DYSLIPIDEMIA: ICD-10-CM

## 2024-07-23 DIAGNOSIS — Z11.59 ENCOUNTER FOR SCREENING FOR OTHER VIRAL DISEASES: ICD-10-CM

## 2024-07-23 DIAGNOSIS — Z11.59 NEED FOR HEPATITIS B SCREENING TEST: ICD-10-CM

## 2024-07-23 DIAGNOSIS — R76.8 HEPATITIS B CORE ANTIBODY POSITIVE: ICD-10-CM

## 2024-07-24 LAB
ALBUMIN SERPL-MCNC: 3.7 G/DL (ref 3.5–5)
ALBUMIN/GLOB SERPL: 1.1 (ref 1.1–2.2)
ALP SERPL-CCNC: 70 U/L (ref 45–117)
ALT SERPL-CCNC: 30 U/L (ref 12–78)
ANION GAP SERPL CALC-SCNC: 4 MMOL/L (ref 5–15)
AST SERPL-CCNC: 27 U/L (ref 15–37)
BILIRUB SERPL-MCNC: 0.5 MG/DL (ref 0.2–1)
BUN SERPL-MCNC: 18 MG/DL (ref 6–20)
BUN/CREAT SERPL: 17 (ref 12–20)
CALCIUM SERPL-MCNC: 9.2 MG/DL (ref 8.5–10.1)
CHLORIDE SERPL-SCNC: 106 MMOL/L (ref 97–108)
CHOLEST SERPL-MCNC: 134 MG/DL
CO2 SERPL-SCNC: 28 MMOL/L (ref 21–32)
CREAT SERPL-MCNC: 1.09 MG/DL (ref 0.7–1.3)
ERYTHROCYTE [DISTWIDTH] IN BLOOD BY AUTOMATED COUNT: 12.4 % (ref 11.5–14.5)
GLOBULIN SER CALC-MCNC: 3.3 G/DL (ref 2–4)
GLUCOSE SERPL-MCNC: 93 MG/DL (ref 65–100)
HBV CORE AB SERPL QL IA: POSITIVE
HBV CORE IGM SERPL QL IA: NEGATIVE
HBV SURFACE AB SER QL: REACTIVE
HBV SURFACE AB SER-ACNC: >1000 MIU/ML
HBV SURFACE AG SER QL: <0.1 INDEX
HBV SURFACE AG SER QL: NEGATIVE
HCT VFR BLD AUTO: 41 % (ref 36.6–50.3)
HDLC SERPL-MCNC: 50 MG/DL
HDLC SERPL: 2.7 (ref 0–5)
HGB BLD-MCNC: 13.5 G/DL (ref 12.1–17)
LDLC SERPL CALC-MCNC: 69.6 MG/DL (ref 0–100)
MCH RBC QN AUTO: 31.3 PG (ref 26–34)
MCHC RBC AUTO-ENTMCNC: 32.9 G/DL (ref 30–36.5)
MCV RBC AUTO: 94.9 FL (ref 80–99)
NRBC # BLD: 0 K/UL (ref 0–0.01)
NRBC BLD-RTO: 0 PER 100 WBC
PLATELET # BLD AUTO: 210 K/UL (ref 150–400)
PMV BLD AUTO: 10.7 FL (ref 8.9–12.9)
POTASSIUM SERPL-SCNC: 4.4 MMOL/L (ref 3.5–5.1)
PROT SERPL-MCNC: 7 G/DL (ref 6.4–8.2)
PSA SERPL-MCNC: 2.3 NG/ML (ref 0.01–4)
RBC # BLD AUTO: 4.32 M/UL (ref 4.1–5.7)
SODIUM SERPL-SCNC: 138 MMOL/L (ref 136–145)
TRIGL SERPL-MCNC: 72 MG/DL
VLDLC SERPL CALC-MCNC: 14.4 MG/DL
WBC # BLD AUTO: 4.9 K/UL (ref 4.1–11.1)

## 2024-08-07 ENCOUNTER — OFFICE VISIT (OUTPATIENT)
Facility: CLINIC | Age: 74
End: 2024-08-07
Payer: MEDICARE

## 2024-08-07 VITALS
OXYGEN SATURATION: 98 % | TEMPERATURE: 97.8 F | DIASTOLIC BLOOD PRESSURE: 62 MMHG | RESPIRATION RATE: 16 BRPM | HEIGHT: 68 IN | HEART RATE: 60 BPM | SYSTOLIC BLOOD PRESSURE: 121 MMHG | BODY MASS INDEX: 22.73 KG/M2 | WEIGHT: 150 LBS

## 2024-08-07 DIAGNOSIS — E78.5 DYSLIPIDEMIA: ICD-10-CM

## 2024-08-07 DIAGNOSIS — Z11.59 NEED FOR HEPATITIS C SCREENING TEST: ICD-10-CM

## 2024-08-07 DIAGNOSIS — D18.03 HEMANGIOMA OF LIVER: ICD-10-CM

## 2024-08-07 DIAGNOSIS — I10 ESSENTIAL HYPERTENSION, BENIGN: ICD-10-CM

## 2024-08-07 DIAGNOSIS — I44.2 COMPLETE HEART BLOCK (HCC): ICD-10-CM

## 2024-08-07 DIAGNOSIS — Z13.6 ENCOUNTER FOR ABDOMINAL AORTIC ANEURYSM (AAA) SCREENING: ICD-10-CM

## 2024-08-07 DIAGNOSIS — Z86.19 HISTORY OF HEPATITIS B: Primary | ICD-10-CM

## 2024-08-07 DIAGNOSIS — Z87.891 HISTORY OF TOBACCO USE: ICD-10-CM

## 2024-08-07 PROCEDURE — 3078F DIAST BP <80 MM HG: CPT | Performed by: FAMILY MEDICINE

## 2024-08-07 PROCEDURE — 3074F SYST BP LT 130 MM HG: CPT | Performed by: FAMILY MEDICINE

## 2024-08-07 PROCEDURE — 99215 OFFICE O/P EST HI 40 MIN: CPT | Performed by: FAMILY MEDICINE

## 2024-08-07 PROCEDURE — 1123F ACP DISCUSS/DSCN MKR DOCD: CPT | Performed by: FAMILY MEDICINE

## 2024-08-07 RX ORDER — ASPIRIN 81 MG/1
81 TABLET ORAL DAILY
Qty: 90 TABLET | Refills: 3 | Status: SHIPPED | OUTPATIENT
Start: 2024-08-07

## 2024-08-07 RX ORDER — LOSARTAN POTASSIUM 25 MG/1
25 TABLET ORAL DAILY
Qty: 90 TABLET | Refills: 3 | Status: SHIPPED | OUTPATIENT
Start: 2024-08-07

## 2024-08-07 RX ORDER — ROSUVASTATIN CALCIUM 20 MG/1
20 TABLET, COATED ORAL DAILY
Qty: 90 TABLET | Refills: 3 | Status: SHIPPED | OUTPATIENT
Start: 2024-08-07

## 2024-08-07 NOTE — PROGRESS NOTES
Chief Complaint   Patient presents with    Follow-up     6 mo fu doing well no new concerns for pcp.  Had labs drawn and would lie providers feedback.  Gen lizzeth is good.     Discuss Labs    Hypertension     Med compliant and pt does not check bp at hoe very often          
rosuvastatin (CRESTOR) 20 MG tablet; Take 1 tablet by mouth daily, Disp-90 tablet, R-3Normal  4. Complete heart block (HCC)  Overview:  11/27/12  [employer] #[id number] dual chamber system  Followed by Dr. Vega of Cardiology here in [place]. Also followed by [facility].     Assessment & Plan:    Monitored by specialist- no acute findings meriting change in the plan  5. Hemangioma of liver  -     US ABDOMEN LIMITED; Future  6. History of tobacco use  -     Vascular AAA screening; Future  7. Encounter for abdominal aortic aneurysm (AAA) screening  -     Vascular AAA screening; Future  8. Need for hepatitis C screening test  -     Hepatitis C Antibody; Future        It was a pleasure seeing Mr. Ariane Chacon today.    No follow-ups on file.      The following portions of the patient's history were reviewed and updated as appropriate: allergies, current medications, family history, medical history, social history, surgical history and problem list.    OBJECTIVE FINDINGS:    /62   Pulse 60   Temp 97.8 °F (36.6 °C)   Resp 16   Ht 1.727 m (5' 8\")   Wt 68 kg (150 lb)   SpO2 98%   BMI 22.81 kg/m²     Physical exam:  Constitutional: well-appearing, no acute distress  Eyes: EOM intact. PERRL bilateral. Not icteric.  Cardiac: normal rate, regular rhythm.  Pulm: normal rate, normal effort.  Skin: normal color and turgor.  Lower extrem: no edema.  Abd: no swelling, no tenderness.    Note: relevant labs reviewed and noted in the Overview section above.      On this date 8/7/2024 I have spent 40 minutes reviewing previous notes, test results and face to face with the patient discussing the diagnosis and importance of compliance with the treatment plan as well as documenting on the day of the visit.    An electronic signature was used to authenticate this note.     Portions of this note may have been populated using smart dictation software and may have \"sounds-like\" errors present.     Pt was counseled on risks, benefits and

## 2024-08-07 NOTE — ASSESSMENT & PLAN NOTE
Shows prior infection, no active infection. Recommend checking liver due to unknown timeline. No need for future monitoring unless liver labs elevated

## 2024-08-21 ENCOUNTER — HOSPITAL ENCOUNTER (OUTPATIENT)
Facility: HOSPITAL | Age: 74
Discharge: HOME | End: 2024-08-24
Attending: FAMILY MEDICINE
Payer: MEDICARE

## 2024-08-21 DIAGNOSIS — Z86.19 HISTORY OF HEPATITIS B: ICD-10-CM

## 2024-08-21 DIAGNOSIS — Z13.6 ENCOUNTER FOR ABDOMINAL AORTIC ANEURYSM (AAA) SCREENING: ICD-10-CM

## 2024-08-21 DIAGNOSIS — Z87.891 HISTORY OF TOBACCO USE: ICD-10-CM

## 2024-08-21 DIAGNOSIS — D18.03 HEMANGIOMA OF LIVER: ICD-10-CM

## 2024-08-21 PROCEDURE — 76706 US ABDL AORTA SCREEN AAA: CPT

## 2024-08-21 PROCEDURE — 76705 ECHO EXAM OF ABDOMEN: CPT

## 2024-08-28 PROBLEM — D18.03 HEMANGIOMA OF LIVER: Status: ACTIVE | Noted: 2024-08-28

## 2025-03-11 ENCOUNTER — TELEPHONE (OUTPATIENT)
Facility: CLINIC | Age: 75
End: 2025-03-11

## 2025-03-12 ENCOUNTER — OFFICE VISIT (OUTPATIENT)
Facility: CLINIC | Age: 75
End: 2025-03-12
Payer: MEDICARE

## 2025-03-12 VITALS
TEMPERATURE: 97.6 F | HEIGHT: 68 IN | RESPIRATION RATE: 18 BRPM | OXYGEN SATURATION: 97 % | DIASTOLIC BLOOD PRESSURE: 73 MMHG | HEART RATE: 71 BPM | SYSTOLIC BLOOD PRESSURE: 113 MMHG | WEIGHT: 148 LBS | BODY MASS INDEX: 22.43 KG/M2

## 2025-03-12 DIAGNOSIS — R05.1 ACUTE COUGH: Primary | ICD-10-CM

## 2025-03-12 PROCEDURE — 99213 OFFICE O/P EST LOW 20 MIN: CPT | Performed by: FAMILY MEDICINE

## 2025-03-12 PROCEDURE — 1036F TOBACCO NON-USER: CPT | Performed by: FAMILY MEDICINE

## 2025-03-12 PROCEDURE — 3074F SYST BP LT 130 MM HG: CPT | Performed by: FAMILY MEDICINE

## 2025-03-12 PROCEDURE — 1123F ACP DISCUSS/DSCN MKR DOCD: CPT | Performed by: FAMILY MEDICINE

## 2025-03-12 PROCEDURE — G8427 DOCREV CUR MEDS BY ELIG CLIN: HCPCS | Performed by: FAMILY MEDICINE

## 2025-03-12 PROCEDURE — G8420 CALC BMI NORM PARAMETERS: HCPCS | Performed by: FAMILY MEDICINE

## 2025-03-12 PROCEDURE — 3017F COLORECTAL CA SCREEN DOC REV: CPT | Performed by: FAMILY MEDICINE

## 2025-03-12 PROCEDURE — 1159F MED LIST DOCD IN RCRD: CPT | Performed by: FAMILY MEDICINE

## 2025-03-12 PROCEDURE — 3078F DIAST BP <80 MM HG: CPT | Performed by: FAMILY MEDICINE

## 2025-03-12 PROCEDURE — 1126F AMNT PAIN NOTED NONE PRSNT: CPT | Performed by: FAMILY MEDICINE

## 2025-03-12 ASSESSMENT — PATIENT HEALTH QUESTIONNAIRE - PHQ9
1. LITTLE INTEREST OR PLEASURE IN DOING THINGS: NOT AT ALL
SUM OF ALL RESPONSES TO PHQ QUESTIONS 1-9: 0
2. FEELING DOWN, DEPRESSED OR HOPELESS: NOT AT ALL

## 2025-03-12 NOTE — PROGRESS NOTES
Assessment & Plan  1. Dry cough: Likely due to postnasal drip. Sinuses clearing debris with mucus present. No exacerbating medications. No antibiotics needed currently.  - Monitor symptoms for a week; cough may persist 2-3 weeks but should improve.  - Advise saline rinses and steam vapor.  - If no improvement by day 10 or worsening symptoms, report for potential antibiotic selection.    Follow-up  - If no improvement by day 10 or worsening symptoms, report for potential antibiotic selection.    It was a pleasure seeing Mr. Ariane Chacon today.  No follow-ups on file.    History of Present Illness  The patient is a 74-year-old male presenting with a 3-4 day history of persistent non-productive cough and mild pharyngitis.    Cough  - Persistent for 3-4 days  - Non-productive  - Likely secondary to postnasal drip    Pharyngitis  - Mild    Supplemental information: He denies any respiratory congestion or nasal obstruction. His wife recently returned from [place] and has been experiencing a 2-week duration of cough and upper respiratory symptoms, which have persisted despite antibiotic therapy. The patient, who is retired and predominantly homebound, reports no other known exposure to infectious agents, no history of seasonal allergies, ocular pruritus, or asthma. No home-based viral testing has been conducted.    ALLERGIES  No known allergies.    The following portions of the patient's history were reviewed and updated as appropriate: allergies, current medications, family history, medical history, social history, surgical history and problem list.    /73 (BP Site: Left Upper Arm, Patient Position: Sitting, BP Cuff Size: Medium Adult)   Pulse 71   Temp 97.6 °F (36.4 °C) (Temporal)   Resp 18   Ht 1.727 m (5' 8\")   Wt 67.1 kg (148 lb)   SpO2 97%   BMI 22.50 kg/m²   Physical Exam  Constitutional: Well-appearing, no acute distress  HEENT: Normocephalic, atraumatic.  PERRL.  EOM intact bilateral.  External ear exam

## 2025-03-12 NOTE — PROGRESS NOTES
Chief Complaint   Patient presents with    Cough     Patient states that he developed a dry cough 3-4 days ago. No other symptoms at this time.     1. Have you been to the ER, urgent care clinic since your last visit?  Hospitalized since your last visit?No    2. Have you seen or consulted any other health care providers outside of the [facility] since your last visit?  Include any pap smears or colon screening. No

## 2025-03-17 ENCOUNTER — TELEPHONE (OUTPATIENT)
Age: 75
End: 2025-03-17

## 2025-03-17 NOTE — TELEPHONE ENCOUNTER
Called and left a message for the patient to call back to verify if he is being treated by [facility] cardiologist-Dr Bernard or if he is planning to come in on 3/24/25 to see Ingrid Duff NP.

## 2025-03-25 NOTE — PROGRESS NOTES
Cardiac Electrophysiology Office Follow-up    NAME: Ariane Cahcon   :  1950  MRM:  [id number]    Date:  3/28/2025     Primary Cardiologist:     Assessment and Plan:     1. Complete heart block (HCC)  -     EKG 12 Lead  2. Pacemaker  3. Essential hypertension, benign  4. Dyslipidemia  5. Shortness of breath  -     Echo (TTE) complete (PRN contrast/bubble/strain/3D); Future         Complete heart block: Pacemaker dependent, ventricular pacing more than 99% of the time.  - Normal device interrogation as noted below  - Echocardiogram to assess for pacemaker mediated cardiomyopathy   - Follow up with Dr. Vega in one year     [employer] dual chamber pacemaker (DOI generator 2020, leads 2012):   Today's device check with normal function.   Battery life 5.8 years. No new or significant lead issues requiring intervention. No significant arrhythmias noted requiring intervention. RV EGM setting turned back on. Atrial pacing 39%, ventricular pacing >99%.  No atrial fibrillation detected.  - Remote transmission every 3 months    Hypertension: BP is well controlled on the current regimen. No change in antihypertensive medications today. Recommended routine exercise and low sodium diet.  - Continue losartan 25 mg daily    Hyperlipidemia: Continue Crestor 5 mg daily                    Subjective:     Ariane Chacon, a 74 y.o. year-old who presents for followup of heart block and PPM.     He feels well and denies chest pain, palpitations, dyspnea, dizziness or syncope. He walks at least 3 miles daily without any exertional symptoms.       Review of Systems:   12 point review of systems was performed. All negative except for HPI    Past Medical History:   Diagnosis Date    Hyperlipidemia     Other ill-defined conditions(799.89)     right renal mass    Other ill-defined conditions(799.89)     right renal mass    Pacemaker 12    [employer] #[id number] dual chamber system  CHB     Past Surgical History:   Procedure

## 2025-03-28 ENCOUNTER — PROCEDURE VISIT (OUTPATIENT)
Age: 75
End: 2025-03-28
Payer: MEDICARE

## 2025-03-28 ENCOUNTER — OFFICE VISIT (OUTPATIENT)
Age: 75
End: 2025-03-28
Payer: MEDICARE

## 2025-03-28 VITALS
OXYGEN SATURATION: 98 % | DIASTOLIC BLOOD PRESSURE: 78 MMHG | SYSTOLIC BLOOD PRESSURE: 126 MMHG | HEART RATE: 66 BPM | BODY MASS INDEX: 22.96 KG/M2 | WEIGHT: 151 LBS

## 2025-03-28 DIAGNOSIS — E78.5 DYSLIPIDEMIA: ICD-10-CM

## 2025-03-28 DIAGNOSIS — I44.2 COMPLETE HEART BLOCK (HCC): Primary | ICD-10-CM

## 2025-03-28 DIAGNOSIS — R06.02 SHORTNESS OF BREATH: ICD-10-CM

## 2025-03-28 DIAGNOSIS — I10 ESSENTIAL HYPERTENSION, BENIGN: ICD-10-CM

## 2025-03-28 DIAGNOSIS — Z95.0 PRESENCE OF CARDIAC PACEMAKER: Primary | ICD-10-CM

## 2025-03-28 DIAGNOSIS — Z95.0 PACEMAKER: ICD-10-CM

## 2025-03-28 PROCEDURE — 3017F COLORECTAL CA SCREEN DOC REV: CPT | Performed by: NURSE PRACTITIONER

## 2025-03-28 PROCEDURE — G8427 DOCREV CUR MEDS BY ELIG CLIN: HCPCS | Performed by: NURSE PRACTITIONER

## 2025-03-28 PROCEDURE — 93010 ELECTROCARDIOGRAM REPORT: CPT | Performed by: NURSE PRACTITIONER

## 2025-03-28 PROCEDURE — 93280 PM DEVICE PROGR EVAL DUAL: CPT | Performed by: INTERNAL MEDICINE

## 2025-03-28 PROCEDURE — 1160F RVW MEDS BY RX/DR IN RCRD: CPT | Performed by: NURSE PRACTITIONER

## 2025-03-28 PROCEDURE — 3074F SYST BP LT 130 MM HG: CPT | Performed by: NURSE PRACTITIONER

## 2025-03-28 PROCEDURE — 1036F TOBACCO NON-USER: CPT | Performed by: NURSE PRACTITIONER

## 2025-03-28 PROCEDURE — 99214 OFFICE O/P EST MOD 30 MIN: CPT | Performed by: NURSE PRACTITIONER

## 2025-03-28 PROCEDURE — G8420 CALC BMI NORM PARAMETERS: HCPCS | Performed by: NURSE PRACTITIONER

## 2025-03-28 PROCEDURE — 3078F DIAST BP <80 MM HG: CPT | Performed by: NURSE PRACTITIONER

## 2025-03-28 PROCEDURE — 93005 ELECTROCARDIOGRAM TRACING: CPT | Performed by: NURSE PRACTITIONER

## 2025-03-28 PROCEDURE — 1123F ACP DISCUSS/DSCN MKR DOCD: CPT | Performed by: NURSE PRACTITIONER

## 2025-03-28 PROCEDURE — 1159F MED LIST DOCD IN RCRD: CPT | Performed by: NURSE PRACTITIONER

## 2025-03-28 NOTE — PROGRESS NOTES
Chief Complaint   Patient presents with    Other     COMPLETE HEART BLOCK     Vitals:    03/28/25 1323   BP: 126/78   BP Site: Left Upper Arm   Patient Position: Sitting   BP Cuff Size: Medium Adult   Pulse: 66   SpO2: 98%   Weight: 68.5 kg (151 lb)      /78 (BP Site: Left Upper Arm, Patient Position: Sitting, BP Cuff Size: Medium Adult)   Pulse 66   Wt 68.5 kg (151 lb)   SpO2 98%   BMI 22.96 kg/m²

## 2025-06-04 ENCOUNTER — TELEPHONE (OUTPATIENT)
Age: 75
End: 2025-06-04

## 2025-06-04 NOTE — TELEPHONE ENCOUNTER
TC to Patient. ID Verified.     Patient advised Ena Rodas,NP is able to see his Echo report and she is more than glad to see him to discuss the results and next steps for Treatment.   Patient is insistent he only wants to see  he has been scheduled for 09/11/2025 Patient was wanting  to just review his Echo and call him. Advised unfortunately we needs to be seen to discuss results and recommendations. NP is aware he declined appointment to see her.

## 2025-06-04 NOTE — TELEPHONE ENCOUNTER
Patient called because he had a cath yesterday 6/3. Patient mentioned Dr Velasco would like to make some changes to his pacemaker. Patient request to see Vega. Next available is Sept and Jan. Let patient know that I will request a call back. Scheduled patient appointment for 9/1.    # [phone]

## 2025-08-19 ENCOUNTER — PATIENT MESSAGE (OUTPATIENT)
Facility: CLINIC | Age: 75
End: 2025-08-19

## 2025-08-19 DIAGNOSIS — I10 ESSENTIAL HYPERTENSION, BENIGN: ICD-10-CM

## 2025-08-19 DIAGNOSIS — E78.5 DYSLIPIDEMIA: ICD-10-CM

## 2025-08-19 DIAGNOSIS — Z86.19 HISTORY OF HEPATITIS B: ICD-10-CM

## 2025-08-19 DIAGNOSIS — Z11.59 NEED FOR HEPATITIS C SCREENING TEST: ICD-10-CM

## 2025-08-22 DIAGNOSIS — Z87.891 HISTORY OF TOBACCO USE: ICD-10-CM

## 2025-08-22 DIAGNOSIS — R06.02 SHORTNESS OF BREATH: ICD-10-CM

## 2025-08-22 DIAGNOSIS — I10 ESSENTIAL HYPERTENSION, BENIGN: Primary | ICD-10-CM

## 2025-08-22 DIAGNOSIS — E78.5 DYSLIPIDEMIA: ICD-10-CM

## 2025-08-22 DIAGNOSIS — Z11.59 NEED FOR HEPATITIS C SCREENING TEST: ICD-10-CM

## 2025-08-22 DIAGNOSIS — I10 ESSENTIAL HYPERTENSION, BENIGN: ICD-10-CM

## 2025-08-22 LAB
ALBUMIN SERPL-MCNC: 3.8 G/DL (ref 3.5–5.2)
ALBUMIN/GLOB SERPL: 1.2 (ref 1.1–2.2)
ALP SERPL-CCNC: 85 U/L (ref 40–129)
ALT SERPL-CCNC: 41 U/L (ref 10–50)
ANION GAP SERPL CALC-SCNC: 9 MMOL/L (ref 2–14)
AST SERPL-CCNC: 50 U/L (ref 10–50)
BILIRUB SERPL-MCNC: 0.4 MG/DL (ref 0–1.2)
BUN SERPL-MCNC: 20 MG/DL (ref 8–23)
CALCIUM SERPL-MCNC: 9.3 MG/DL (ref 8.8–10.2)
CHLORIDE SERPL-SCNC: 106 MMOL/L (ref 98–107)
CHOLEST SERPL-MCNC: 127 MG/DL (ref 0–200)
CO2 SERPL-SCNC: 23 MMOL/L (ref 20–29)
CREAT SERPL-MCNC: 1.23 MG/DL (ref 0.7–1.2)
ERYTHROCYTE [DISTWIDTH] IN BLOOD BY AUTOMATED COUNT: 13.3 % (ref 11.5–14.5)
GLOBULIN SER CALC-MCNC: 3.1 G/DL (ref 2–4)
GLUCOSE SERPL-MCNC: 82 MG/DL (ref 65–100)
HCT VFR BLD AUTO: 39.9 % (ref 36.6–50.3)
HCV AB SER QL: NONREACTIVE
HDLC SERPL-MCNC: 47 MG/DL (ref 40–60)
HDLC SERPL: 2.7 (ref 0–5)
HGB BLD-MCNC: 12.7 G/DL (ref 12.1–17)
LDLC SERPL CALC-MCNC: 67 MG/DL (ref 0–100)
MCH RBC QN AUTO: 30.6 PG (ref 26–34)
MCHC RBC AUTO-ENTMCNC: 31.8 G/DL (ref 30–36.5)
MCV RBC AUTO: 96.1 FL (ref 80–99)
NRBC # BLD: 0 K/UL (ref 0–0.01)
NRBC BLD-RTO: 0 PER 100 WBC
PLATELET # BLD AUTO: 186 K/UL (ref 150–400)
PMV BLD AUTO: 11 FL (ref 8.9–12.9)
POTASSIUM SERPL-SCNC: 5.5 MMOL/L (ref 3.5–5.1)
PROT SERPL-MCNC: 6.9 G/DL (ref 6.4–8.3)
RBC # BLD AUTO: 4.15 M/UL (ref 4.1–5.7)
SODIUM SERPL-SCNC: 138 MMOL/L (ref 136–145)
TRIGL SERPL-MCNC: 65 MG/DL (ref 0–150)
VLDLC SERPL CALC-MCNC: 13 MG/DL
WBC # BLD AUTO: 5.1 K/UL (ref 4.1–11.1)

## 2025-08-26 ENCOUNTER — OFFICE VISIT (OUTPATIENT)
Facility: CLINIC | Age: 75
End: 2025-08-26
Payer: MEDICARE

## 2025-08-26 VITALS
OXYGEN SATURATION: 97 % | WEIGHT: 150 LBS | BODY MASS INDEX: 22.73 KG/M2 | SYSTOLIC BLOOD PRESSURE: 127 MMHG | HEIGHT: 68 IN | HEART RATE: 64 BPM | DIASTOLIC BLOOD PRESSURE: 88 MMHG | RESPIRATION RATE: 20 BRPM | TEMPERATURE: 97.3 F

## 2025-08-26 DIAGNOSIS — R79.89 ELEVATED SERUM CREATININE: ICD-10-CM

## 2025-08-26 DIAGNOSIS — E78.5 DYSLIPIDEMIA: ICD-10-CM

## 2025-08-26 DIAGNOSIS — Z12.5 PROSTATE CANCER SCREENING: ICD-10-CM

## 2025-08-26 DIAGNOSIS — I10 ESSENTIAL HYPERTENSION, BENIGN: ICD-10-CM

## 2025-08-26 DIAGNOSIS — Z95.0 PACEMAKER: ICD-10-CM

## 2025-08-26 DIAGNOSIS — E87.5 HYPERKALEMIA: ICD-10-CM

## 2025-08-26 DIAGNOSIS — N40.1 BENIGN PROSTATIC HYPERPLASIA WITH URINARY FREQUENCY: ICD-10-CM

## 2025-08-26 DIAGNOSIS — I44.2 COMPLETE HEART BLOCK (HCC): ICD-10-CM

## 2025-08-26 DIAGNOSIS — R35.0 BENIGN PROSTATIC HYPERPLASIA WITH URINARY FREQUENCY: ICD-10-CM

## 2025-08-26 DIAGNOSIS — Z00.00 MEDICARE ANNUAL WELLNESS VISIT, SUBSEQUENT: Primary | ICD-10-CM

## 2025-08-26 PROCEDURE — G8420 CALC BMI NORM PARAMETERS: HCPCS | Performed by: FAMILY MEDICINE

## 2025-08-26 PROCEDURE — 1126F AMNT PAIN NOTED NONE PRSNT: CPT | Performed by: FAMILY MEDICINE

## 2025-08-26 PROCEDURE — 1123F ACP DISCUSS/DSCN MKR DOCD: CPT | Performed by: FAMILY MEDICINE

## 2025-08-26 PROCEDURE — 3079F DIAST BP 80-89 MM HG: CPT | Performed by: FAMILY MEDICINE

## 2025-08-26 PROCEDURE — 3017F COLORECTAL CA SCREEN DOC REV: CPT | Performed by: FAMILY MEDICINE

## 2025-08-26 PROCEDURE — 1159F MED LIST DOCD IN RCRD: CPT | Performed by: FAMILY MEDICINE

## 2025-08-26 PROCEDURE — 1036F TOBACCO NON-USER: CPT | Performed by: FAMILY MEDICINE

## 2025-08-26 PROCEDURE — 99214 OFFICE O/P EST MOD 30 MIN: CPT | Performed by: FAMILY MEDICINE

## 2025-08-26 PROCEDURE — G8427 DOCREV CUR MEDS BY ELIG CLIN: HCPCS | Performed by: FAMILY MEDICINE

## 2025-08-26 PROCEDURE — 3074F SYST BP LT 130 MM HG: CPT | Performed by: FAMILY MEDICINE

## 2025-08-26 PROCEDURE — G0439 PPPS, SUBSEQ VISIT: HCPCS | Performed by: FAMILY MEDICINE

## 2025-08-26 SDOH — ECONOMIC STABILITY: FOOD INSECURITY: WITHIN THE PAST 12 MONTHS, YOU WORRIED THAT YOUR FOOD WOULD RUN OUT BEFORE YOU GOT MONEY TO BUY MORE.: NEVER TRUE

## 2025-08-26 SDOH — ECONOMIC STABILITY: FOOD INSECURITY: WITHIN THE PAST 12 MONTHS, THE FOOD YOU BOUGHT JUST DIDN'T LAST AND YOU DIDN'T HAVE MONEY TO GET MORE.: NEVER TRUE

## 2025-08-26 ASSESSMENT — PATIENT HEALTH QUESTIONNAIRE - PHQ9
SUM OF ALL RESPONSES TO PHQ QUESTIONS 1-9: 0
2. FEELING DOWN, DEPRESSED OR HOPELESS: NOT AT ALL
SUM OF ALL RESPONSES TO PHQ QUESTIONS 1-9: 0
1. LITTLE INTEREST OR PLEASURE IN DOING THINGS: NOT AT ALL

## 2025-08-26 ASSESSMENT — LIFESTYLE VARIABLES
HOW MANY STANDARD DRINKS CONTAINING ALCOHOL DO YOU HAVE ON A TYPICAL DAY: 1 OR 2
HOW OFTEN DO YOU HAVE A DRINK CONTAINING ALCOHOL: MONTHLY OR LESS

## (undated) DEVICE — SUTURE STRATAFIX SPRL MCRYL + SZ 3-0 L18IN ABSRB UD PS-2 SXMP1B107

## (undated) DEVICE — 3M™ IOBAN™ 2 ANTIMICROBIAL INCISE DRAPE 6640EZ: Brand: IOBAN™ 2

## (undated) DEVICE — REM POLYHESIVE ADULT PATIENT RETURN ELECTRODE: Brand: VALLEYLAB

## (undated) DEVICE — SUTURE ABSRB L30CM 2-0 VLT SPRL PDS + STRATAFIX SXPP1B410

## (undated) DEVICE — PACEMAKER PACK: Brand: MEDLINE INDUSTRIES, INC.

## (undated) DEVICE — PLASMABLADE PS200-040 4.0: Brand: PLASMABLADE™

## (undated) DEVICE — MEDI-TRACE CADENCE ADULT, DEFIBRILLATION ELECTRODE -RTS  (10 PR/PK) - PHYSIO-CONTROL: Brand: MEDI-TRACE CADENCE